# Patient Record
Sex: MALE | Race: WHITE | Employment: UNEMPLOYED | URBAN - METROPOLITAN AREA
[De-identification: names, ages, dates, MRNs, and addresses within clinical notes are randomized per-mention and may not be internally consistent; named-entity substitution may affect disease eponyms.]

---

## 2017-09-16 ENCOUNTER — APPOINTMENT (OUTPATIENT)
Dept: CT IMAGING | Age: 47
End: 2017-09-16
Payer: SELF-PAY

## 2017-09-16 ENCOUNTER — HOSPITAL ENCOUNTER (EMERGENCY)
Age: 47
Discharge: HOME | End: 2017-09-16
Attending: EMERGENCY MEDICINE
Payer: SELF-PAY

## 2017-09-16 VITALS
DIASTOLIC BLOOD PRESSURE: 82 MMHG | OXYGEN SATURATION: 86 % | TEMPERATURE: 98.9 F | SYSTOLIC BLOOD PRESSURE: 131 MMHG | WEIGHT: 200 LBS | BODY MASS INDEX: 29.62 KG/M2 | HEART RATE: 85 BPM | RESPIRATION RATE: 18 BRPM | HEIGHT: 69 IN

## 2017-09-16 DIAGNOSIS — Z53.20 LEFT BEFORE TREATMENT COMPLETED: ICD-10-CM

## 2017-09-16 DIAGNOSIS — S09.90XA HEAD TRAUMA, INITIAL ENCOUNTER: Primary | ICD-10-CM

## 2017-09-16 PROCEDURE — 99282 EMERGENCY DEPT VISIT SF MDM: CPT | Performed by: EMERGENCY MEDICINE

## 2017-09-16 PROCEDURE — 72125 CT NECK SPINE W/O DYE: CPT

## 2017-09-16 PROCEDURE — 74011250637 HC RX REV CODE- 250/637: Performed by: PHYSICIAN ASSISTANT

## 2017-09-16 PROCEDURE — 70450 CT HEAD/BRAIN W/O DYE: CPT

## 2017-09-16 RX ORDER — ONDANSETRON 4 MG/1
4 TABLET, ORALLY DISINTEGRATING ORAL
Status: COMPLETED | OUTPATIENT
Start: 2017-09-16 | End: 2017-09-16

## 2017-09-16 RX ORDER — ONDANSETRON 2 MG/ML
4 INJECTION INTRAMUSCULAR; INTRAVENOUS
Status: DISCONTINUED | OUTPATIENT
Start: 2017-09-16 | End: 2017-09-17 | Stop reason: HOSPADM

## 2017-09-16 RX ADMIN — ONDANSETRON 4 MG: 4 TABLET, ORALLY DISINTEGRATING ORAL at 17:56
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
## 2017-09-17 NOTE — ED NOTES
Offered to move patient into room with bed. Pt stated \"my wife is going to get the car. No one is doing anything for me\"  \"the doctor saw me an hour ago and nothing is being done\"    Note earlier note of time MD with patient for assessment.
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
## 2018-06-01 ENCOUNTER — APPOINTMENT (OUTPATIENT)
Dept: GENERAL RADIOLOGY | Age: 48
End: 2018-06-01
Attending: EMERGENCY MEDICINE
Payer: SELF-PAY

## 2018-06-01 ENCOUNTER — HOSPITAL ENCOUNTER (EMERGENCY)
Age: 48
Discharge: HOME | End: 2018-06-01
Attending: EMERGENCY MEDICINE
Payer: SELF-PAY

## 2018-06-01 VITALS
TEMPERATURE: 97.7 F | SYSTOLIC BLOOD PRESSURE: 115 MMHG | HEART RATE: 98 BPM | OXYGEN SATURATION: 97 % | HEIGHT: 69 IN | BODY MASS INDEX: 28.14 KG/M2 | RESPIRATION RATE: 16 BRPM | DIASTOLIC BLOOD PRESSURE: 71 MMHG | WEIGHT: 190 LBS

## 2018-06-01 DIAGNOSIS — S63.502A LEFT WRIST SPRAIN, INITIAL ENCOUNTER: Primary | ICD-10-CM

## 2018-06-01 DIAGNOSIS — R73.9 HYPERGLYCEMIA: ICD-10-CM

## 2018-06-01 LAB
ALBUMIN SERPL-MCNC: 3.5 G/DL (ref 3.5–5)
ALBUMIN/GLOB SERPL: 0.9 {RATIO} (ref 1.2–3.5)
ALP SERPL-CCNC: 113 U/L (ref 50–136)
ALT SERPL-CCNC: 25 U/L (ref 12–65)
ANION GAP SERPL CALC-SCNC: 8 MMOL/L (ref 7–16)
AST SERPL-CCNC: 11 U/L (ref 15–37)
BASOPHILS # BLD: 0 K/UL (ref 0–0.2)
BASOPHILS NFR BLD: 0 % (ref 0–2)
BILIRUB SERPL-MCNC: 0.2 MG/DL (ref 0.2–1.1)
BUN SERPL-MCNC: 11 MG/DL (ref 6–23)
CALCIUM SERPL-MCNC: 9.2 MG/DL (ref 8.3–10.4)
CHLORIDE SERPL-SCNC: 101 MMOL/L (ref 98–107)
CO2 SERPL-SCNC: 27 MMOL/L (ref 21–32)
CREAT SERPL-MCNC: 0.88 MG/DL (ref 0.8–1.5)
DIFFERENTIAL METHOD BLD: ABNORMAL
EOSINOPHIL # BLD: 0.1 K/UL (ref 0–0.8)
EOSINOPHIL NFR BLD: 2 % (ref 0.5–7.8)
ERYTHROCYTE [DISTWIDTH] IN BLOOD BY AUTOMATED COUNT: 14.6 % (ref 11.9–14.6)
GLOBULIN SER CALC-MCNC: 4.1 G/DL (ref 2.3–3.5)
GLUCOSE BLD STRIP.AUTO-MCNC: 182 MG/DL (ref 65–100)
GLUCOSE BLD STRIP.AUTO-MCNC: 366 MG/DL (ref 65–100)
GLUCOSE SERPL-MCNC: 419 MG/DL (ref 65–100)
HCT VFR BLD AUTO: 43.2 % (ref 41.1–50.3)
HGB BLD-MCNC: 14 G/DL (ref 13.6–17.2)
IMM GRANULOCYTES # BLD: 0 K/UL (ref 0–0.5)
IMM GRANULOCYTES NFR BLD AUTO: 0 % (ref 0–5)
LYMPHOCYTES # BLD: 4.1 K/UL (ref 0.5–4.6)
LYMPHOCYTES NFR BLD: 49 % (ref 13–44)
MCH RBC QN AUTO: 26.9 PG (ref 26.1–32.9)
MCHC RBC AUTO-ENTMCNC: 32.4 G/DL (ref 31.4–35)
MCV RBC AUTO: 83.1 FL (ref 79.6–97.8)
MONOCYTES # BLD: 0.4 K/UL (ref 0.1–1.3)
MONOCYTES NFR BLD: 5 % (ref 4–12)
NEUTS SEG # BLD: 3.7 K/UL (ref 1.7–8.2)
NEUTS SEG NFR BLD: 44 % (ref 43–78)
PLATELET # BLD AUTO: 300 K/UL (ref 150–450)
PMV BLD AUTO: 10 FL (ref 10.8–14.1)
POTASSIUM SERPL-SCNC: 3.9 MMOL/L (ref 3.5–5.1)
PROT SERPL-MCNC: 7.6 G/DL (ref 6.3–8.2)
RBC # BLD AUTO: 5.2 M/UL (ref 4.23–5.67)
SODIUM SERPL-SCNC: 136 MMOL/L (ref 136–145)
WBC # BLD AUTO: 8.4 K/UL (ref 4.3–11.1)

## 2018-06-01 PROCEDURE — 80053 COMPREHEN METABOLIC PANEL: CPT | Performed by: EMERGENCY MEDICINE

## 2018-06-01 PROCEDURE — 99284 EMERGENCY DEPT VISIT MOD MDM: CPT | Performed by: EMERGENCY MEDICINE

## 2018-06-01 PROCEDURE — 85025 COMPLETE CBC W/AUTO DIFF WBC: CPT | Performed by: EMERGENCY MEDICINE

## 2018-06-01 PROCEDURE — 73110 X-RAY EXAM OF WRIST: CPT

## 2018-06-01 PROCEDURE — 74011636637 HC RX REV CODE- 636/637: Performed by: EMERGENCY MEDICINE

## 2018-06-01 PROCEDURE — 82962 GLUCOSE BLOOD TEST: CPT

## 2018-06-01 PROCEDURE — 96374 THER/PROPH/DIAG INJ IV PUSH: CPT | Performed by: EMERGENCY MEDICINE

## 2018-06-01 RX ORDER — METFORMIN HYDROCHLORIDE 500 MG/1
500 TABLET ORAL 2 TIMES DAILY WITH MEALS
Qty: 60 TAB | Refills: 0 | Status: SHIPPED | OUTPATIENT
Start: 2018-06-01 | End: 2018-07-01

## 2018-06-01 RX ADMIN — INSULIN HUMAN 10 UNITS: 100 INJECTION, SOLUTION PARENTERAL at 12:28

## 2018-06-01 NOTE — ED NOTES
I have reviewed discharge instructions with the patient. The patient verbalized understanding. Patient left ED via Discharge Method: ambulatory to Home with family. Opportunity for questions and clarification provided. Patient given 1 scripts. To continue your aftercare when you leave the hospital, you may receive an automated call from our care team to check in on how you are doing. This is a free service and part of our promise to provide the best care and service to meet your aftercare needs.  If you have questions, or wish to unsubscribe from this service please call [phone]. Thank you for Choosing our [facility] Emergency Department.
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
## 2018-06-01 NOTE — DISCHARGE INSTRUCTIONS
Learning About RICE (Rest, Ice, Compression, and Elevation)  What is RICE? RICE is a way to care for an injury. RICE helps relieve pain and swelling. It may also help with healing and flexibility. RICE stands for:  · Rest and protect the injured or sore area. · Ice or a cold pack used as soon as possible. · Compression, or wrapping the injured or sore area with an elastic bandage. · Elevation (propping up) the injured or sore area. How do you do RICE? You can use RICE for home treatment when you have general aches and pains or after an injury or surgery. Rest  · Do not put weight on the injury for at least 24 to 48 hours. · Use crutches for a badly sprained knee or ankle. · Support a sprained wrist, elbow, or shoulder with a sling. Ice  · Put ice or a cold pack on the injury right away to reduce pain and swelling. Frozen vegetables will also work as an ice pack. Put a thin cloth between the ice or cold pack and your skin. The cloth protects the injured area from getting too cold. · Use ice for 10 to 15 minutes at a time for the first 48 to 72 hours. Compression  · Use compression for sprains, strains, and surgeries of the arms and legs. · Wrap the injured area with an elastic bandage or compression sleeve to reduce swelling. · Don't wrap it too tightly. If the area below it feels numb, tingles, or feels cool, loosen the wrap. Elevation  · Use elevation for areas of the body that can be propped up, such as arms and legs. · Prop up the injured area on pillows whenever you use ice. Keep it propped up anytime you sit or lie down. · Try to keep the injured area at or above the level of your heart. This will help reduce swelling and bruising. Where can you learn more? Go to http://xochitl-ming.info/. Enter Q816 in the search box to learn more about \"Learning About RICE (Rest, Ice, Compression, and Elevation). \"  Current as of: March 21, 2017  Content Version: 11.4  © 8405-8521 Healthwise, Incorporated. Care instructions adapted under license by [employer] (which disclaims liability or warranty for this information). If you have questions about a medical condition or this instruction, always ask your healthcare professional. [address] any warranty or liability for your use of this information. Learning About High Blood Sugar  What is high blood sugar? Your body turns the food you eat into glucose (sugar), which it uses for energy. But if your body isn't able to use the sugar right away, it can build up in your blood and lead to high blood sugar. When the amount of sugar in your blood stays too high for too much of the time, you may have diabetes. Diabetes is a disease that can cause serious health problems. The good news is that lifestyle changes may help you get your blood sugar back to normal and avoid or delay diabetes. What causes high blood sugar? Sugar (glucose) can build up in your blood if you:  · Are overweight. · Have a family history of diabetes. · Take certain medicines, such as steroids. What are the symptoms? Having high blood sugar may not cause any symptoms at all. Or it may make you feel very thirsty or very hungry. You may also urinate more often than usual, have blurry vision, or lose weight without trying. How is high blood sugar treated? You can take steps to lower your blood sugar level if you understand what makes it get higher. Your doctor may want you to learn how to test your blood sugar level at home. Then you can see how illness, stress, or different kinds of food or medicine raise or lower your blood sugar level. Other tests may be needed to see if you have diabetes. How can you prevent high blood sugar? · Watch your weight. If you're overweight, losing just a small amount of weight may help. Reducing fat around your waist is most important.   · Limit the amount of calories, sweets, and unhealthy fat you eat. Ask your doctor if a dietitian can help you. A registered dietitian can help you create meal plans that fit your lifestyle. · Get at least 30 minutes of exercise on most days of the week. Exercise helps control your blood sugar. It also helps you maintain a healthy weight. Walking is a good choice. You also may want to do other activities, such as running, swimming, cycling, or playing tennis or team sports. · If your doctor prescribed medicines, take them exactly as prescribed. Call your doctor if you think you are having a problem with your medicine. You will get more details on the specific medicines your doctor prescribes. Follow-up care is a key part of your treatment and safety. Be sure to make and go to all appointments, and call your doctor if you are having problems. It's also a good idea to know your test results and keep a list of the medicines you take. Where can you learn more? Go to http://xochitl-ming.info/. Enter O108 in the search box to learn more about \"Learning About High Blood Sugar. \"  Current as of: March 13, 2017  Content Version: 11.4  © 5401-3435 Healthwise, Incorporated. Care instructions adapted under license by [employer] (which disclaims liability or warranty for this information). If you have questions about a medical condition or this instruction, always ask your healthcare professional. [address] any warranty or liability for your use of this information.
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
## 2020-06-21 ENCOUNTER — APPOINTMENT (OUTPATIENT)
Dept: GENERAL RADIOLOGY | Age: 50
DRG: 174 | End: 2020-06-21
Attending: EMERGENCY MEDICINE
Payer: MEDICAID

## 2020-06-21 ENCOUNTER — HOSPITAL ENCOUNTER (INPATIENT)
Age: 50
LOS: 2 days | Discharge: HOME | DRG: 174 | End: 2020-06-23
Attending: EMERGENCY MEDICINE | Admitting: INTERNAL MEDICINE
Payer: MEDICAID

## 2020-06-21 DIAGNOSIS — I50.21 ACUTE HFREF (HEART FAILURE WITH REDUCED EJECTION FRACTION) (HCC): ICD-10-CM

## 2020-06-21 DIAGNOSIS — I21.19 ACUTE ST ELEVATION MYOCARDIAL INFARCTION (STEMI) INVOLVING OTHER CORONARY ARTERY OF INFERIOR WALL (HCC): Primary | ICD-10-CM

## 2020-06-21 DIAGNOSIS — G81.94 ACUTE LEFT HEMIPARESIS (HCC): ICD-10-CM

## 2020-06-21 DIAGNOSIS — E78.5 DYSLIPIDEMIA ASSOCIATED WITH TYPE 2 DIABETES MELLITUS (HCC): ICD-10-CM

## 2020-06-21 DIAGNOSIS — I21.01 ST ELEVATION MYOCARDIAL INFARCTION INVOLVING LEFT MAIN CORONARY ARTERY (HCC): ICD-10-CM

## 2020-06-21 DIAGNOSIS — E11.69 DYSLIPIDEMIA ASSOCIATED WITH TYPE 2 DIABETES MELLITUS (HCC): ICD-10-CM

## 2020-06-21 PROBLEM — I21.3 STEMI (ST ELEVATION MYOCARDIAL INFARCTION) (HCC): Status: ACTIVE | Noted: 2020-06-21

## 2020-06-21 LAB
ALBUMIN SERPL-MCNC: 3.3 G/DL (ref 3.5–5)
ALBUMIN/GLOB SERPL: 0.9 {RATIO} (ref 1.2–3.5)
ALP SERPL-CCNC: 101 U/L (ref 50–136)
ALT SERPL-CCNC: 32 U/L (ref 12–65)
ANION GAP SERPL CALC-SCNC: 6 MMOL/L (ref 7–16)
AST SERPL-CCNC: 22 U/L (ref 15–37)
BASOPHILS # BLD: 0.1 K/UL (ref 0–0.2)
BASOPHILS NFR BLD: 1 % (ref 0–2)
BILIRUB SERPL-MCNC: 0.3 MG/DL (ref 0.2–1.1)
BUN SERPL-MCNC: 16 MG/DL (ref 6–23)
CALCIUM SERPL-MCNC: 8.3 MG/DL (ref 8.3–10.4)
CHLORIDE SERPL-SCNC: 106 MMOL/L (ref 98–107)
CO2 SERPL-SCNC: 26 MMOL/L (ref 21–32)
CREAT SERPL-MCNC: 1.14 MG/DL (ref 0.8–1.5)
DIFFERENTIAL METHOD BLD: ABNORMAL
EOSINOPHIL # BLD: 0.2 K/UL (ref 0–0.8)
EOSINOPHIL NFR BLD: 2 % (ref 0.5–7.8)
ERYTHROCYTE [DISTWIDTH] IN BLOOD BY AUTOMATED COUNT: 13.4 % (ref 11.9–14.6)
EST. AVERAGE GLUCOSE BLD GHB EST-MCNC: 303 MG/DL
GLOBULIN SER CALC-MCNC: 3.7 G/DL (ref 2.3–3.5)
GLUCOSE BLD STRIP.AUTO-MCNC: 374 MG/DL (ref 65–100)
GLUCOSE SERPL-MCNC: 406 MG/DL (ref 65–100)
HBA1C MFR BLD: 12.2 % (ref 4.8–6)
HCT VFR BLD AUTO: 42.8 % (ref 41.1–50.3)
HGB BLD-MCNC: 13.9 G/DL (ref 13.6–17.2)
IMM GRANULOCYTES # BLD AUTO: 0 K/UL (ref 0–0.5)
IMM GRANULOCYTES NFR BLD AUTO: 0 % (ref 0–5)
LYMPHOCYTES # BLD: 3.5 K/UL (ref 0.5–4.6)
LYMPHOCYTES NFR BLD: 31 % (ref 13–44)
MCH RBC QN AUTO: 27.5 PG (ref 26.1–32.9)
MCHC RBC AUTO-ENTMCNC: 32.5 G/DL (ref 31.4–35)
MCV RBC AUTO: 84.6 FL (ref 79.6–97.8)
MONOCYTES # BLD: 0.6 K/UL (ref 0.1–1.3)
MONOCYTES NFR BLD: 5 % (ref 4–12)
NEUTS SEG # BLD: 7.1 K/UL (ref 1.7–8.2)
NEUTS SEG NFR BLD: 62 % (ref 43–78)
NRBC # BLD: 0 K/UL (ref 0–0.2)
PLATELET # BLD AUTO: 239 K/UL (ref 150–450)
PMV BLD AUTO: 9.8 FL (ref 9.4–12.3)
POTASSIUM SERPL-SCNC: 4.5 MMOL/L (ref 3.5–5.1)
PROT SERPL-MCNC: 7 G/DL (ref 6.3–8.2)
RBC # BLD AUTO: 5.06 M/UL (ref 4.23–5.6)
SODIUM SERPL-SCNC: 138 MMOL/L (ref 136–145)
TROPONIN-HIGH SENSITIVITY: 182 PG/ML (ref 0–14)
TROPONIN-HIGH SENSITIVITY: 9153.9 PG/ML (ref 0–14)
WBC # BLD AUTO: 11.5 K/UL (ref 4.3–11.1)

## 2020-06-21 PROCEDURE — 85347 COAGULATION TIME ACTIVATED: CPT

## 2020-06-21 PROCEDURE — C1725 CATH, TRANSLUMIN NON-LASER: HCPCS

## 2020-06-21 PROCEDURE — 80061 LIPID PANEL: CPT

## 2020-06-21 PROCEDURE — C1769 GUIDE WIRE: HCPCS

## 2020-06-21 PROCEDURE — 36415 COLL VENOUS BLD VENIPUNCTURE: CPT

## 2020-06-21 PROCEDURE — 99285 EMERGENCY DEPT VISIT HI MDM: CPT

## 2020-06-21 PROCEDURE — 77030011222 HC DEV INFL PTCA BSC -B

## 2020-06-21 PROCEDURE — 99152 MOD SED SAME PHYS/QHP 5/>YRS: CPT

## 2020-06-21 PROCEDURE — 84443 ASSAY THYROID STIM HORMONE: CPT

## 2020-06-21 PROCEDURE — 84484 ASSAY OF TROPONIN QUANT: CPT

## 2020-06-21 PROCEDURE — 96374 THER/PROPH/DIAG INJ IV PUSH: CPT

## 2020-06-21 PROCEDURE — 74011000250 HC RX REV CODE- 250: Performed by: INTERNAL MEDICINE

## 2020-06-21 PROCEDURE — 96375 TX/PRO/DX INJ NEW DRUG ADDON: CPT

## 2020-06-21 PROCEDURE — 74011636637 HC RX REV CODE- 636/637: Performed by: NURSE PRACTITIONER

## 2020-06-21 PROCEDURE — 92921 HC PTCA SNGL MAJOR COR VESL/BRNCH EA ADD RC: CPT

## 2020-06-21 PROCEDURE — B2111ZZ FLUOROSCOPY OF MULTIPLE CORONARY ARTERIES USING LOW OSMOLAR CONTRAST: ICD-10-PCS | Performed by: INTERNAL MEDICINE

## 2020-06-21 PROCEDURE — C1874 STENT, COATED/COV W/DEL SYS: HCPCS

## 2020-06-21 PROCEDURE — 4A023N7 MEASUREMENT OF CARDIAC SAMPLING AND PRESSURE, LEFT HEART, PERCUTANEOUS APPROACH: ICD-10-PCS | Performed by: INTERNAL MEDICINE

## 2020-06-21 PROCEDURE — 80053 COMPREHEN METABOLIC PANEL: CPT

## 2020-06-21 PROCEDURE — 92941 PRQ TRLML REVSC TOT OCCL AMI: CPT

## 2020-06-21 PROCEDURE — 83036 HEMOGLOBIN GLYCOSYLATED A1C: CPT

## 2020-06-21 PROCEDURE — 77030015766

## 2020-06-21 PROCEDURE — 93458 L HRT ARTERY/VENTRICLE ANGIO: CPT

## 2020-06-21 PROCEDURE — 77030019569 HC BND COMPR RAD TERU -B

## 2020-06-21 PROCEDURE — B2151ZZ FLUOROSCOPY OF LEFT HEART USING LOW OSMOLAR CONTRAST: ICD-10-PCS | Performed by: INTERNAL MEDICINE

## 2020-06-21 PROCEDURE — 77030040393 HC DRSG OPTIFOAM GENT MDII -B

## 2020-06-21 PROCEDURE — 027037Z DILATION OF CORONARY ARTERY, ONE ARTERY WITH FOUR OR MORE DRUG-ELUTING INTRALUMINAL DEVICES, PERCUTANEOUS APPROACH: ICD-10-PCS | Performed by: INTERNAL MEDICINE

## 2020-06-21 PROCEDURE — 74011250636 HC RX REV CODE- 250/636: Performed by: EMERGENCY MEDICINE

## 2020-06-21 PROCEDURE — 74011250637 HC RX REV CODE- 250/637: Performed by: NURSE PRACTITIONER

## 2020-06-21 PROCEDURE — 74011250636 HC RX REV CODE- 250/636: Performed by: NURSE PRACTITIONER

## 2020-06-21 PROCEDURE — C1894 INTRO/SHEATH, NON-LASER: HCPCS

## 2020-06-21 PROCEDURE — 77030012468 HC VLV BLEEDBK CNTRL ABBT -B

## 2020-06-21 PROCEDURE — 85025 COMPLETE CBC W/AUTO DIFF WBC: CPT

## 2020-06-21 PROCEDURE — 82962 GLUCOSE BLOOD TEST: CPT

## 2020-06-21 PROCEDURE — 74011636320 HC RX REV CODE- 636/320: Performed by: INTERNAL MEDICINE

## 2020-06-21 PROCEDURE — 74011250637 HC RX REV CODE- 250/637: Performed by: INTERNAL MEDICINE

## 2020-06-21 PROCEDURE — 99153 MOD SED SAME PHYS/QHP EA: CPT

## 2020-06-21 PROCEDURE — 65610000001 HC ROOM ICU GENERAL

## 2020-06-21 PROCEDURE — C1887 CATHETER, GUIDING: HCPCS

## 2020-06-21 PROCEDURE — 74011250636 HC RX REV CODE- 250/636: Performed by: INTERNAL MEDICINE

## 2020-06-21 PROCEDURE — 92929 HC PLC DE STNT +/-PTA MAJOR COR VESL/BRNCH  ADD RC: CPT

## 2020-06-21 PROCEDURE — 77030016699 HC CATH ANGI DX INFN1 CARD -A

## 2020-06-21 RX ORDER — ACETAMINOPHEN 325 MG/1
650 TABLET ORAL
Status: DISCONTINUED | OUTPATIENT
Start: 2020-06-21 | End: 2020-06-23 | Stop reason: HOSPADM

## 2020-06-21 RX ORDER — ONDANSETRON 2 MG/ML
4 INJECTION INTRAMUSCULAR; INTRAVENOUS
Status: COMPLETED | OUTPATIENT
Start: 2020-06-21 | End: 2020-06-21

## 2020-06-21 RX ORDER — MORPHINE SULFATE 10 MG/ML
4 INJECTION, SOLUTION INTRAMUSCULAR; INTRAVENOUS
Status: DISCONTINUED | OUTPATIENT
Start: 2020-06-21 | End: 2020-06-23 | Stop reason: HOSPADM

## 2020-06-21 RX ORDER — IBUPROFEN 200 MG
1 TABLET ORAL EVERY 24 HOURS
Status: DISCONTINUED | OUTPATIENT
Start: 2020-06-21 | End: 2020-06-23 | Stop reason: HOSPADM

## 2020-06-21 RX ORDER — HEPARIN SODIUM 10000 [USP'U]/ML
40-80 INJECTION, SOLUTION INTRAVENOUS; SUBCUTANEOUS
Status: DISCONTINUED | OUTPATIENT
Start: 2020-06-21 | End: 2020-06-21

## 2020-06-21 RX ORDER — ACETAMINOPHEN 325 MG/1
650 TABLET ORAL
Status: DISCONTINUED | OUTPATIENT
Start: 2020-06-21 | End: 2020-06-21 | Stop reason: SDUPTHER

## 2020-06-21 RX ORDER — LIDOCAINE HYDROCHLORIDE 10 MG/ML
1-30 INJECTION, SOLUTION EPIDURAL; INFILTRATION; INTRACAUDAL; PERINEURAL ONCE
Status: COMPLETED | OUTPATIENT
Start: 2020-06-21 | End: 2020-06-21

## 2020-06-21 RX ORDER — MIDAZOLAM HYDROCHLORIDE 1 MG/ML
.5-2 INJECTION, SOLUTION INTRAMUSCULAR; INTRAVENOUS
Status: DISCONTINUED | OUTPATIENT
Start: 2020-06-21 | End: 2020-06-21

## 2020-06-21 RX ORDER — ATORVASTATIN CALCIUM 80 MG/1
80 TABLET, FILM COATED ORAL
Status: DISCONTINUED | OUTPATIENT
Start: 2020-06-21 | End: 2020-06-23 | Stop reason: HOSPADM

## 2020-06-21 RX ORDER — SODIUM CHLORIDE 0.9 % (FLUSH) 0.9 %
5-40 SYRINGE (ML) INJECTION AS NEEDED
Status: DISCONTINUED | OUTPATIENT
Start: 2020-06-21 | End: 2020-06-21 | Stop reason: SDUPTHER

## 2020-06-21 RX ORDER — MORPHINE SULFATE 2 MG/ML
2 INJECTION, SOLUTION INTRAMUSCULAR; INTRAVENOUS
Status: DISCONTINUED | OUTPATIENT
Start: 2020-06-21 | End: 2020-06-23 | Stop reason: HOSPADM

## 2020-06-21 RX ORDER — HYDROCODONE BITARTRATE AND ACETAMINOPHEN 5; 325 MG/1; MG/1
1 TABLET ORAL
Status: DISCONTINUED | OUTPATIENT
Start: 2020-06-21 | End: 2020-06-23 | Stop reason: HOSPADM

## 2020-06-21 RX ORDER — INSULIN LISPRO 100 [IU]/ML
INJECTION, SOLUTION INTRAVENOUS; SUBCUTANEOUS
Status: DISCONTINUED | OUTPATIENT
Start: 2020-06-21 | End: 2020-06-23 | Stop reason: HOSPADM

## 2020-06-21 RX ORDER — METOPROLOL TARTRATE 25 MG/1
25 TABLET, FILM COATED ORAL 2 TIMES DAILY
Status: DISCONTINUED | OUTPATIENT
Start: 2020-06-21 | End: 2020-06-22

## 2020-06-21 RX ORDER — LORAZEPAM 1 MG/1
1 TABLET ORAL
Status: DISCONTINUED | OUTPATIENT
Start: 2020-06-21 | End: 2020-06-23 | Stop reason: HOSPADM

## 2020-06-21 RX ORDER — HEPARIN SODIUM 5000 [USP'U]/ML
5000 INJECTION, SOLUTION INTRAVENOUS; SUBCUTANEOUS
Status: COMPLETED | OUTPATIENT
Start: 2020-06-21 | End: 2020-06-21

## 2020-06-21 RX ORDER — GUAIFENESIN 100 MG/5ML
81 LIQUID (ML) ORAL DAILY
Status: DISCONTINUED | OUTPATIENT
Start: 2020-06-22 | End: 2020-06-23 | Stop reason: HOSPADM

## 2020-06-21 RX ORDER — ONDANSETRON 2 MG/ML
4 INJECTION INTRAMUSCULAR; INTRAVENOUS
Status: DISCONTINUED | OUTPATIENT
Start: 2020-06-21 | End: 2020-06-23 | Stop reason: HOSPADM

## 2020-06-21 RX ORDER — MAG HYDROX/ALUMINUM HYD/SIMETH 200-200-20
30 SUSPENSION, ORAL (FINAL DOSE FORM) ORAL
Status: DISCONTINUED | OUTPATIENT
Start: 2020-06-21 | End: 2020-06-23 | Stop reason: HOSPADM

## 2020-06-21 RX ORDER — INSULIN LISPRO 100 [IU]/ML
INJECTION, SOLUTION INTRAVENOUS; SUBCUTANEOUS
Status: DISCONTINUED | OUTPATIENT
Start: 2020-06-21 | End: 2020-06-21 | Stop reason: SDUPTHER

## 2020-06-21 RX ORDER — DOCUSATE SODIUM 100 MG/1
100 CAPSULE, LIQUID FILLED ORAL
Status: DISCONTINUED | OUTPATIENT
Start: 2020-06-21 | End: 2020-06-23 | Stop reason: HOSPADM

## 2020-06-21 RX ORDER — LORAZEPAM 2 MG/ML
1 INJECTION INTRAMUSCULAR
Status: DISCONTINUED | OUTPATIENT
Start: 2020-06-21 | End: 2020-06-21 | Stop reason: SDUPTHER

## 2020-06-21 RX ORDER — LISINOPRIL 5 MG/1
5 TABLET ORAL DAILY
Status: DISCONTINUED | OUTPATIENT
Start: 2020-06-22 | End: 2020-06-23 | Stop reason: HOSPADM

## 2020-06-21 RX ORDER — SODIUM CHLORIDE 0.9 % (FLUSH) 0.9 %
5-40 SYRINGE (ML) INJECTION AS NEEDED
Status: DISCONTINUED | OUTPATIENT
Start: 2020-06-21 | End: 2020-06-23 | Stop reason: HOSPADM

## 2020-06-21 RX ORDER — MORPHINE SULFATE 2 MG/ML
2 INJECTION, SOLUTION INTRAMUSCULAR; INTRAVENOUS
Status: COMPLETED | OUTPATIENT
Start: 2020-06-21 | End: 2020-06-21

## 2020-06-21 RX ORDER — DIPHENHYDRAMINE HCL 25 MG
25 CAPSULE ORAL
Status: DISCONTINUED | OUTPATIENT
Start: 2020-06-21 | End: 2020-06-23 | Stop reason: HOSPADM

## 2020-06-21 RX ORDER — NITROGLYCERIN 0.4 MG/1
0.4 TABLET SUBLINGUAL
Status: DISCONTINUED | OUTPATIENT
Start: 2020-06-21 | End: 2020-06-23 | Stop reason: HOSPADM

## 2020-06-21 RX ORDER — PANTOPRAZOLE SODIUM 40 MG/1
40 TABLET, DELAYED RELEASE ORAL
Status: DISCONTINUED | OUTPATIENT
Start: 2020-06-21 | End: 2020-06-23 | Stop reason: HOSPADM

## 2020-06-21 RX ORDER — SODIUM CHLORIDE 9 MG/ML
75 INJECTION, SOLUTION INTRAVENOUS CONTINUOUS
Status: DISCONTINUED | OUTPATIENT
Start: 2020-06-21 | End: 2020-06-23 | Stop reason: HOSPADM

## 2020-06-21 RX ORDER — FENTANYL CITRATE 50 UG/ML
25-50 INJECTION, SOLUTION INTRAMUSCULAR; INTRAVENOUS
Status: DISCONTINUED | OUTPATIENT
Start: 2020-06-21 | End: 2020-06-21

## 2020-06-21 RX ORDER — HEPARIN SODIUM 200 [USP'U]/100ML
3 INJECTION, SOLUTION INTRAVENOUS CONTINUOUS
Status: DISCONTINUED | OUTPATIENT
Start: 2020-06-21 | End: 2020-06-21

## 2020-06-21 RX ORDER — SODIUM CHLORIDE 0.9 % (FLUSH) 0.9 %
5-40 SYRINGE (ML) INJECTION EVERY 8 HOURS
Status: DISCONTINUED | OUTPATIENT
Start: 2020-06-21 | End: 2020-06-23 | Stop reason: HOSPADM

## 2020-06-21 RX ORDER — NITROGLYCERIN 0.4 MG/1
0.4 TABLET SUBLINGUAL
Status: DISCONTINUED | OUTPATIENT
Start: 2020-06-21 | End: 2020-06-21 | Stop reason: SDUPTHER

## 2020-06-21 RX ORDER — SODIUM CHLORIDE 0.9 % (FLUSH) 0.9 %
5-40 SYRINGE (ML) INJECTION EVERY 8 HOURS
Status: DISCONTINUED | OUTPATIENT
Start: 2020-06-21 | End: 2020-06-21 | Stop reason: SDUPTHER

## 2020-06-21 RX ADMIN — METOPROLOL TARTRATE 25 MG: 25 TABLET, FILM COATED ORAL at 20:58

## 2020-06-21 RX ADMIN — ONDANSETRON 4 MG: 2 INJECTION INTRAMUSCULAR; INTRAVENOUS at 18:18

## 2020-06-21 RX ADMIN — HEPARIN SODIUM 1500 UNITS: 10000 INJECTION, SOLUTION INTRAVENOUS; SUBCUTANEOUS at 19:56

## 2020-06-21 RX ADMIN — TICAGRELOR 180 MG: 90 TABLET ORAL at 19:35

## 2020-06-21 RX ADMIN — NITROGLYCERIN 0.15 MG: 200 INJECTION, SOLUTION INTRAVENOUS at 19:40

## 2020-06-21 RX ADMIN — IOPAMIDOL 290 ML: 755 INJECTION, SOLUTION INTRAVENOUS at 19:46

## 2020-06-21 RX ADMIN — MIDAZOLAM 2 MG: 1 INJECTION INTRAMUSCULAR; INTRAVENOUS at 18:54

## 2020-06-21 RX ADMIN — HEPARIN SODIUM 2 ML: 10000 INJECTION, SOLUTION INTRAVENOUS; SUBCUTANEOUS at 18:54

## 2020-06-21 RX ADMIN — NITROGLYCERIN 0.15 MG: 200 INJECTION, SOLUTION INTRAVENOUS at 19:31

## 2020-06-21 RX ADMIN — TIROFIBAN 0.15 MCG/KG/MIN: 5 INJECTION, SOLUTION INTRAVENOUS at 19:07

## 2020-06-21 RX ADMIN — FENTANYL CITRATE 50 MCG: 50 INJECTION INTRAMUSCULAR; INTRAVENOUS at 19:05

## 2020-06-21 RX ADMIN — SODIUM CHLORIDE 75 ML/HR: 9 INJECTION, SOLUTION INTRAVENOUS at 20:08

## 2020-06-21 RX ADMIN — PANTOPRAZOLE SODIUM 40 MG: 40 TABLET, DELAYED RELEASE ORAL at 20:58

## 2020-06-21 RX ADMIN — Medication 10 ML: at 21:01

## 2020-06-21 RX ADMIN — ATORVASTATIN CALCIUM 80 MG: 80 TABLET, FILM COATED ORAL at 21:01

## 2020-06-21 RX ADMIN — HEPARIN SODIUM 5000 UNITS: 5000 INJECTION INTRAVENOUS; SUBCUTANEOUS at 18:17

## 2020-06-21 RX ADMIN — FENTANYL CITRATE 50 MCG: 50 INJECTION INTRAMUSCULAR; INTRAVENOUS at 19:31

## 2020-06-21 RX ADMIN — LIDOCAINE HYDROCHLORIDE 2 ML: 10 INJECTION, SOLUTION EPIDURAL; INFILTRATION; INTRACAUDAL; PERINEURAL at 18:54

## 2020-06-21 RX ADMIN — HEPARIN SODIUM 3 ML/HR: 200 INJECTION, SOLUTION INTRAVENOUS at 18:54

## 2020-06-21 RX ADMIN — MORPHINE SULFATE 2 MG: 2 INJECTION, SOLUTION INTRAMUSCULAR; INTRAVENOUS at 18:18

## 2020-06-21 RX ADMIN — HEPARIN SODIUM 3000 UNITS: 10000 INJECTION, SOLUTION INTRAVENOUS; SUBCUTANEOUS at 19:11

## 2020-06-21 RX ADMIN — INSULIN LISPRO 10 UNITS: 100 INJECTION, SOLUTION INTRAVENOUS; SUBCUTANEOUS at 22:00

## 2020-06-21 NOTE — PROCEDURES
Brief Cardiac Procedure Note    Patient: Asya Moreau MRN: [id number]  SSN: [id number]    YOB: 1970  Age: 44 y.o. Sex: male      Date of Procedure: 6/21/2020     Pre-procedure Diagnosis: STEMI    Post-procedure Diagnosis: Coronary Artery Disease    Reason for Procedure: ACS < or = 24 Hours    Procedure: Left Heart Catheterization with Percutaneous Coronary Intervention    Brief Description of Procedure: PCI RCA INTO PDA    Performed By: Christi Patel MD     Assistants: NONE    Anesthesia: Moderate Sedation    Estimated Blood Loss: Less than 10 mL      Specimens: None    Implants: None    Findings:   LV INF-LAT HK, 45%, EDP 20, NO MR OR AV GRADIENT  LMCA MILD IRREG  LAD MID SMOOTH 30%, D1 MILD IRREG  LCX PROX FOCAL 90%  RCA PROX 95% SHELF, MID 90%, DISTAL 99% AT BIFURCATION, MID PDA 90% HAZY    FULL METAL JACKET FROM PROX PDA TO PROX RCA  OVERLAPPED 2.0 X18 ASHA MID PDA, 2.5 X 30 ONXY DISTAL RCA INTO PROX PDA, OVERLAPPING MID PDA STENT  3.0 X 38 ASHA IN MID AND PROX RCA TO CREATE LONG STENTED REGION FROM PROX RCA INTO MID PDA  JAILED PLB WITH 90% STENOSIS, KISSED 2.0 IN PLB AND 2.5 IN PDA TO 12, GOOD RESULT    RIGHT RADIAL  ASA/BRILINTA/HEPARIN/AGGRASTAT X 8 HOURS    Complications: None    Recommendations: Continue medical therapy.     Signed By: Christi Patel MD     June 21, 2020

## 2020-06-21 NOTE — H&P
[facility] CARDIOLOGY HISTORY AND PHYSICAL    2020 7:58 PM    Admit Date: 2020    Admit Diagnosis: STEMI (ST elevation myocardial infarction) ([facility] [address] [I21.3]      Subjective:   45yo WM with no CAD history presents with acute severe unremitting CP and intermittent recurrent inferior ST elevation with bradycardia consistent with stuttering inferior MI.  Known DM Type II and dyslipidemia but takes no current meds. ? History of hepatitis C as well. Currently hemodynamically stable but still with severe CP in ER despite ASA, heparin, morphine. To cath lab stat. Allergies   Allergen Reactions    Bactrim [Sulfamethoprim] Other (comments)     Burns me inside out    Sulfa (Sulfonamide Antibiotics) Rash       Past Medical History:   Diagnosis Date    Diabetes ([facility] [address]     Hypertension     Other ill-defined conditions(799.89)     high cholesterol       Family History:  Mom  with MI at 52yo. Social History:  No alcohol or illicit drug use. + 1ppd smoker since 12yo. Review of Systems:  Constitutional- no recent fever, chills, abnormal weight loss  Eyes- no recent visual changes  Ears- no recent hearing loss  Cardiac- see HPI  Pulmonary- no wheezing or sputum production  Gastrointestinal- no diarrhea or constipation  Genitourinary- no hematuria or dysuria  Neurologic- no history of CVA or seizure disorder  Musculoskeletal- no arthritis or myalgia  Vascular- no claudication or nonhealing ulcers  Dermatologic- no rash or abnormal hair loss    Objective:      Vitals:    20 1831 20 1836 20 1838 20 1949   BP: 124/79  123/74 129/77   Pulse: 89 88 86 82   Resp:  15 13 18   Temp:       SpO2: 100% 100% 100% 98%   Weight:       Height:           Physical Exam:  Neuro:  Cranial nerves 2-12 intact.   Skin: warm and dry  HEENT:  NC/AT, conjunctiva noninjected, sclera anicteric, oropharynx clear  Neck: supple without adenopathy or thyromegaly  CV: regular rate and rhythm, no murmurs, rubs, or gallops  Lungs: clear bilaterally  Abdomen: soft, nontender, nondistended, normal bowel sounds  Extremities: No cyanosis or edema, distal pulses 2+ and symmetric bilaterally  Psychiatric: alert and oriented x 3    Prior to Admission Medications   Prescriptions Last Dose Informant Patient Reported? Taking?   insulin aspart protamine/insulin aspart (NOVOLOG MIX 70-30) 100 unit/mL (70-30) injection   Yes No   Si Units by SubCUTAneous route Daily (before breakfast). insulin aspart protamine/insulin aspart (NOVOLOG MIX 70-30) 100 unit/mL (70-30) injection   Yes No   Si-12 Units by SubCUTAneous route Daily (before dinner). Facility-Administered Medications: None       Data Review:   Recent Labs     20  1813      K 4.5   BUN 16   CREA 1.14   *   WBC 11.5*   HGB 13.9   HCT 42.8          Assessment and Plan:     Acute inferior MI- The benefits and risks of left heart catheterization and possible percutaneous intervention were discussed with the patient. Risks including but not limited to bleeding, infection, contrast allergy reaction, acute kidney injury, MI, stroke, emergent CABG and death were discussed. The patient understands the risks of the procedure and wishes to proceed. Active Problems:    DM2 (diabetes mellitus, type 2) ([facility]) - slide scale insulin, A1C in AM      HTN (hypertension)- lopressor, lisinopril, titrate as needed      STEMI (ST elevation myocardial infarction) ([facility] [address] (2020)- to cath lab stat- as above. Tobacco abuse- smoking cessation counseling post discharge        LAURY Eden MD  [facility] Cardiology  Pager [phone]

## 2020-06-21 NOTE — PROGRESS NOTES
TRANSFER - OUT REPORT:    Verbal report given to RN on Chasity Ly  being transferred to ICU for routine progression of care       Report consisted of patients Situation, Background, Assessment and Recommendations(SBAR). Information from the following report(s) SBAR, Kardex, Procedure Summary and MAR was reviewed with the receiving nurse. Opportunity for questions and clarification was provided.       STEMI with Dr Frederick Reusing  MADY x 4 RCA   Balloon angioplasty DINESH  aggrastat drip to infuse for 8 hours  3000 heparin    180 brilinta  2 versed  100 fentanyl  Right radial R band 12ml at 1945

## 2020-06-21 NOTE — ED TRIAGE NOTES
Pt arrives per EMS from home. Called for chest pain. EMS found pt with HR 20's. Attempted Atropine. BP low in 80's so couldn't   Sedate when attempted to pace so pt couldn't tolerate. On arrival to ER HR up to 90's and elevation started.  called right to room.

## 2020-06-21 NOTE — ED PROVIDER NOTES
Mask was worn during the entire patient examination. Yasir Hobbs is a 44 y.o. male who presents to the ED with a chief complaint of chest pain. Started about 1 hour prior to arrival.  Patient actually denies any past cardiac history believes he may have had a heart attack one time and stayed at home but he is never had a heart cath or stress test.  He was bradycardic for EMS and they gave atropine and were temporarily pacing him but he was yelling at them for doing that so they stopped. The pain is been very intense and radiates down his left arm. There is been some shortness of breath and nausea associated with it. Past Medical History:   Diagnosis Date    Diabetes ([facility] [address] [facility]     Hypertension     Other ill-defined conditions(799.89)     high cholesterol       Past Surgical History:   Procedure Laterality Date    HX APPENDECTOMY      HX OTHER SURGICAL      hemorrhoidectomy         History reviewed. No pertinent family history.     Social History     Socioeconomic History    Marital status:      Spouse name: Not on file    Number of children: Not on file    Years of education: Not on file    Highest education level: Not on file   Occupational History    Not on file   Social Needs    Financial resource strain: Not on file    Food insecurity     Worry: Not on file     Inability: Not on file    Transportation needs     Medical: Not on file     Non-medical: Not on file   Tobacco Use    Smoking status: Current Every Day Smoker     Packs/day: 1.00   Substance and Sexual Activity    Alcohol use: Yes     Comment: socially    Drug use: No    Sexual activity: Not on file   Lifestyle    Physical activity     Days per week: Not on file     Minutes per session: Not on file    Stress: Not on file   Relationships    Social connections     Talks on phone: Not on file     Gets together: Not on file     Attends Jainism service: Not on file     Active member of club or organization: Not on file Attends meetings of clubs or organizations: Not on file     Relationship status: Not on file    Intimate partner violence     Fear of current or ex partner: Not on file     Emotionally abused: Not on file     Physically abused: Not on file     Forced sexual activity: Not on file   Other Topics Concern    Not on file   Social History Narrative    Not on file         ALLERGIES: Bactrim [sulfamethoprim] and Sulfa (sulfonamide antibiotics)    Review of Systems   Constitutional: Negative for chills and fever. Respiratory: Positive for chest tightness and shortness of breath. Negative for apnea, cough, choking, wheezing and stridor. Cardiovascular: Negative for chest pain and palpitations. Gastrointestinal: Positive for nausea and vomiting. Negative for abdominal pain and diarrhea. Musculoskeletal: Negative for arthralgias, neck pain and neck stiffness. Skin: Negative for color change, pallor and wound. Neurological: Negative for weakness and numbness. All other systems reviewed and are negative. Vitals:    06/21/20 1809   BP: 125/77   Pulse: 94   Resp: 18   Temp: 97.8 °F (36.6 °C)   SpO2: 100%   Weight: 88.5 kg (195 lb)   Height: 5' 9\" (1.753 m)            Physical Exam  Vitals signs and nursing note reviewed. Constitutional:       General: He is in acute distress. Appearance: He is well-developed. He is ill-appearing, toxic-appearing and diaphoretic. HENT:      Head: Normocephalic and atraumatic. Eyes:      Conjunctiva/sclera: Conjunctivae normal.   Neck:      Musculoskeletal: No neck rigidity. Cardiovascular:      Rate and Rhythm: Normal rate and regular rhythm. Pulmonary:      Effort: Pulmonary effort is normal. No tachypnea, accessory muscle usage or respiratory distress. Breath sounds: No stridor. No decreased breath sounds, wheezing, rhonchi or rales. Skin:     General: Skin is warm. Capillary Refill: Capillary refill takes less than 2 seconds.       Coloration: Skin is pale. Neurological:      General: No focal deficit present. Mental Status: He is alert and oriented to person, place, and time. Cranial Nerves: No cranial nerve deficit. Motor: No weakness. Psychiatric:         Mood and Affect: Mood is anxious. Behavior: Behavior is agitated. MDM  Number of Diagnoses or Management Options  Diagnosis management comments: EMSs EKG that they obtained just prior to patient's arrival shows inferior STEMI. Irises not as conclusive I did call STEMI alert and spoke with cardiology given that he looks like he is having a STEMI incredibly uncomfortable and does have a pattern on EMS EKG. He received aspirin prior to arrival and we will give heparin now. We will not give nitroglycerin given his hypotension with inferior MI. Melissa Will MD; 6/21/2020 @6:21 PM Voice dictation software was used during the making of this note. This software is not perfect and grammatical and other typographical errors may be present.   This note has not been proofread for errors.  ===================================================================          Amount and/or Complexity of Data Reviewed  Clinical lab tests: ordered and reviewed  Independent visualization of images, tracings, or specimens: yes           Procedures

## 2020-06-22 LAB
ANION GAP SERPL CALC-SCNC: 4 MMOL/L (ref 7–16)
ANION GAP SERPL CALC-SCNC: 7 MMOL/L (ref 7–16)
ATRIAL RATE: 72 BPM
ATRIAL RATE: 88 BPM
ATRIAL RATE: 92 BPM
BASOPHILS # BLD: 0.1 K/UL (ref 0–0.2)
BASOPHILS NFR BLD: 1 % (ref 0–2)
BUN SERPL-MCNC: 13 MG/DL (ref 6–23)
BUN SERPL-MCNC: 15 MG/DL (ref 6–23)
CALCIUM SERPL-MCNC: 7.8 MG/DL (ref 8.3–10.4)
CALCIUM SERPL-MCNC: 8.2 MG/DL (ref 8.3–10.4)
CALCULATED P AXIS, ECG09: 37 DEGREES
CALCULATED P AXIS, ECG09: 42 DEGREES
CALCULATED P AXIS, ECG09: 50 DEGREES
CALCULATED R AXIS, ECG10: -22 DEGREES
CALCULATED R AXIS, ECG10: -59 DEGREES
CALCULATED R AXIS, ECG10: 14 DEGREES
CALCULATED T AXIS, ECG11: 42 DEGREES
CALCULATED T AXIS, ECG11: 61 DEGREES
CALCULATED T AXIS, ECG11: 64 DEGREES
CHLORIDE SERPL-SCNC: 109 MMOL/L (ref 98–107)
CHLORIDE SERPL-SCNC: 111 MMOL/L (ref 98–107)
CHOLEST SERPL-MCNC: 162 MG/DL
CO2 SERPL-SCNC: 26 MMOL/L (ref 21–32)
CO2 SERPL-SCNC: 28 MMOL/L (ref 21–32)
CREAT SERPL-MCNC: 0.69 MG/DL (ref 0.8–1.5)
CREAT SERPL-MCNC: 0.89 MG/DL (ref 0.8–1.5)
DIAGNOSIS, 93000: NORMAL
DIFFERENTIAL METHOD BLD: ABNORMAL
EOSINOPHIL # BLD: 0.1 K/UL (ref 0–0.8)
EOSINOPHIL NFR BLD: 1 % (ref 0.5–7.8)
ERYTHROCYTE [DISTWIDTH] IN BLOOD BY AUTOMATED COUNT: 13.6 % (ref 11.9–14.6)
ERYTHROCYTE [DISTWIDTH] IN BLOOD BY AUTOMATED COUNT: 13.7 % (ref 11.9–14.6)
EST. AVERAGE GLUCOSE BLD GHB EST-MCNC: 303 MG/DL
GLUCOSE BLD STRIP.AUTO-MCNC: 124 MG/DL (ref 65–100)
GLUCOSE BLD STRIP.AUTO-MCNC: 255 MG/DL (ref 65–100)
GLUCOSE BLD STRIP.AUTO-MCNC: 268 MG/DL (ref 65–100)
GLUCOSE BLD STRIP.AUTO-MCNC: 287 MG/DL (ref 65–100)
GLUCOSE SERPL-MCNC: 179 MG/DL (ref 65–100)
GLUCOSE SERPL-MCNC: 76 MG/DL (ref 65–100)
HBA1C MFR BLD: 12.2 % (ref 4.8–6)
HCT VFR BLD AUTO: 38.4 % (ref 41.1–50.3)
HCT VFR BLD AUTO: 41.1 % (ref 41.1–50.3)
HDLC SERPL-MCNC: 39 MG/DL (ref 40–60)
HDLC SERPL: 4.2 {RATIO}
HGB BLD-MCNC: 12.9 G/DL (ref 13.6–17.2)
HGB BLD-MCNC: 13.2 G/DL (ref 13.6–17.2)
IMM GRANULOCYTES # BLD AUTO: 0 K/UL (ref 0–0.5)
IMM GRANULOCYTES NFR BLD AUTO: 0 % (ref 0–5)
LDLC SERPL CALC-MCNC: 94 MG/DL
LIPID PROFILE,FLP: ABNORMAL
LYMPHOCYTES # BLD: 5.7 K/UL (ref 0.5–4.6)
LYMPHOCYTES NFR BLD: 46 % (ref 13–44)
MCH RBC QN AUTO: 27 PG (ref 26.1–32.9)
MCH RBC QN AUTO: 28 PG (ref 26.1–32.9)
MCHC RBC AUTO-ENTMCNC: 32.1 G/DL (ref 31.4–35)
MCHC RBC AUTO-ENTMCNC: 33.6 G/DL (ref 31.4–35)
MCV RBC AUTO: 83.3 FL (ref 79.6–97.8)
MCV RBC AUTO: 84.2 FL (ref 79.6–97.8)
MONOCYTES # BLD: 0.5 K/UL (ref 0.1–1.3)
MONOCYTES NFR BLD: 4 % (ref 4–12)
NEUTS SEG # BLD: 5.9 K/UL (ref 1.7–8.2)
NEUTS SEG NFR BLD: 48 % (ref 43–78)
NRBC # BLD: 0 K/UL (ref 0–0.2)
NRBC # BLD: 0 K/UL (ref 0–0.2)
P-R INTERVAL, ECG05: 158 MS
P-R INTERVAL, ECG05: 192 MS
P-R INTERVAL, ECG05: 210 MS
PLATELET # BLD AUTO: 235 K/UL (ref 150–450)
PLATELET # BLD AUTO: 265 K/UL (ref 150–450)
PLATELET COMMENTS,PCOM: ADEQUATE
PMV BLD AUTO: 10 FL (ref 9.4–12.3)
PMV BLD AUTO: 9.6 FL (ref 9.4–12.3)
POTASSIUM SERPL-SCNC: 3.2 MMOL/L (ref 3.5–5.1)
POTASSIUM SERPL-SCNC: 4.8 MMOL/L (ref 3.5–5.1)
Q-T INTERVAL, ECG07: 386 MS
Q-T INTERVAL, ECG07: 388 MS
Q-T INTERVAL, ECG07: 424 MS
QRS DURATION, ECG06: 112 MS
QRS DURATION, ECG06: 98 MS
QRS DURATION, ECG06: 98 MS
QTC CALCULATION (BEZET), ECG08: 464 MS
QTC CALCULATION (BEZET), ECG08: 469 MS
QTC CALCULATION (BEZET), ECG08: 477 MS
RBC # BLD AUTO: 4.61 M/UL (ref 4.23–5.6)
RBC # BLD AUTO: 4.88 M/UL (ref 4.23–5.6)
RBC MORPH BLD: ABNORMAL
SODIUM SERPL-SCNC: 141 MMOL/L (ref 136–145)
SODIUM SERPL-SCNC: 144 MMOL/L (ref 136–145)
TRIGL SERPL-MCNC: 145 MG/DL (ref 35–150)
TROPONIN-HIGH SENSITIVITY: ABNORMAL PG/ML (ref 0–14)
TSH SERPL DL<=0.005 MIU/L-ACNC: 0.35 UIU/ML (ref 0.36–3.74)
VENTRICULAR RATE, ECG03: 72 BPM
VENTRICULAR RATE, ECG03: 88 BPM
VENTRICULAR RATE, ECG03: 92 BPM
VLDLC SERPL CALC-MCNC: 29 MG/DL (ref 6–23)
WBC # BLD AUTO: 12 K/UL (ref 4.3–11.1)
WBC # BLD AUTO: 12.3 K/UL (ref 4.3–11.1)
WBC MORPH BLD: ABNORMAL

## 2020-06-22 PROCEDURE — 74011250637 HC RX REV CODE- 250/637: Performed by: NURSE PRACTITIONER

## 2020-06-22 PROCEDURE — 74011250637 HC RX REV CODE- 250/637: Performed by: INTERNAL MEDICINE

## 2020-06-22 PROCEDURE — 65660000000 HC RM CCU STEPDOWN

## 2020-06-22 PROCEDURE — 80048 BASIC METABOLIC PNL TOTAL CA: CPT

## 2020-06-22 PROCEDURE — 82962 GLUCOSE BLOOD TEST: CPT

## 2020-06-22 PROCEDURE — 74011000250 HC RX REV CODE- 250: Performed by: INTERNAL MEDICINE

## 2020-06-22 PROCEDURE — 85027 COMPLETE CBC AUTOMATED: CPT

## 2020-06-22 PROCEDURE — 93005 ELECTROCARDIOGRAM TRACING: CPT | Performed by: NURSE PRACTITIONER

## 2020-06-22 PROCEDURE — 74011250636 HC RX REV CODE- 250/636: Performed by: INTERNAL MEDICINE

## 2020-06-22 PROCEDURE — C8929 TTE W OR WO FOL WCON,DOPPLER: HCPCS

## 2020-06-22 PROCEDURE — 74011250636 HC RX REV CODE- 250/636: Performed by: NURSE PRACTITIONER

## 2020-06-22 PROCEDURE — 74011636637 HC RX REV CODE- 636/637: Performed by: NURSE PRACTITIONER

## 2020-06-22 RX ORDER — POTASSIUM CHLORIDE 20 MEQ/1
40 TABLET, EXTENDED RELEASE ORAL 2 TIMES DAILY
Status: COMPLETED | OUTPATIENT
Start: 2020-06-22 | End: 2020-06-22

## 2020-06-22 RX ORDER — POTASSIUM CHLORIDE 14.9 MG/ML
20 INJECTION INTRAVENOUS
Status: COMPLETED | OUTPATIENT
Start: 2020-06-22 | End: 2020-06-22

## 2020-06-22 RX ORDER — CARVEDILOL 3.12 MG/1
3.12 TABLET ORAL 2 TIMES DAILY WITH MEALS
Status: DISCONTINUED | OUTPATIENT
Start: 2020-06-22 | End: 2020-06-23 | Stop reason: HOSPADM

## 2020-06-22 RX ADMIN — POTASSIUM CHLORIDE 40 MEQ: 20 TABLET, EXTENDED RELEASE ORAL at 08:09

## 2020-06-22 RX ADMIN — PERFLUTREN 1 ML: 6.52 INJECTION, SUSPENSION INTRAVENOUS at 10:00

## 2020-06-22 RX ADMIN — Medication 5 ML: at 23:18

## 2020-06-22 RX ADMIN — HYDROCODONE BITARTRATE AND ACETAMINOPHEN 1 TABLET: 5; 325 TABLET ORAL at 17:31

## 2020-06-22 RX ADMIN — ATORVASTATIN CALCIUM 80 MG: 80 TABLET, FILM COATED ORAL at 23:14

## 2020-06-22 RX ADMIN — Medication 10 ML: at 13:16

## 2020-06-22 RX ADMIN — TICAGRELOR 90 MG: 90 TABLET ORAL at 07:12

## 2020-06-22 RX ADMIN — CARVEDILOL 3.12 MG: 3.12 TABLET, FILM COATED ORAL at 17:03

## 2020-06-22 RX ADMIN — ASPIRIN 81 MG 81 MG: 81 TABLET ORAL at 08:08

## 2020-06-22 RX ADMIN — CARVEDILOL 3.12 MG: 3.12 TABLET, FILM COATED ORAL at 08:08

## 2020-06-22 RX ADMIN — POTASSIUM CHLORIDE 20 MEQ: 200 INJECTION, SOLUTION INTRAVENOUS at 04:45

## 2020-06-22 RX ADMIN — INSULIN LISPRO 6 UNITS: 100 INJECTION, SOLUTION INTRAVENOUS; SUBCUTANEOUS at 13:16

## 2020-06-22 RX ADMIN — ONDANSETRON 4 MG: 2 INJECTION INTRAMUSCULAR; INTRAVENOUS at 12:02

## 2020-06-22 RX ADMIN — POTASSIUM CHLORIDE 40 MEQ: 20 TABLET, EXTENDED RELEASE ORAL at 17:03

## 2020-06-22 RX ADMIN — HYDROCODONE BITARTRATE AND ACETAMINOPHEN 1 TABLET: 5; 325 TABLET ORAL at 12:02

## 2020-06-22 RX ADMIN — SODIUM CHLORIDE 75 ML/HR: 9 INJECTION, SOLUTION INTRAVENOUS at 10:04

## 2020-06-22 RX ADMIN — LISINOPRIL 5 MG: 5 TABLET ORAL at 08:08

## 2020-06-22 RX ADMIN — PANTOPRAZOLE SODIUM 40 MG: 40 TABLET, DELAYED RELEASE ORAL at 07:12

## 2020-06-22 RX ADMIN — TICAGRELOR 90 MG: 90 TABLET ORAL at 19:41

## 2020-06-22 RX ADMIN — INSULIN LISPRO 6 UNITS: 100 INJECTION, SOLUTION INTRAVENOUS; SUBCUTANEOUS at 23:16

## 2020-06-22 RX ADMIN — Medication 10 ML: at 06:00

## 2020-06-22 RX ADMIN — POTASSIUM CHLORIDE 20 MEQ: 200 INJECTION, SOLUTION INTRAVENOUS at 08:07

## 2020-06-22 RX ADMIN — INSULIN LISPRO 6 UNITS: 100 INJECTION, SOLUTION INTRAVENOUS; SUBCUTANEOUS at 17:03

## 2020-06-22 NOTE — PROGRESS NOTES
Patient to ICU from cath lab. TR band to right radial artery. Site C/D/I with no bleeding or hematoma noted. No pressure injury noted. Dual skin assessment performed with Pablito Gutierrez RN, and allevyn placed on sacrum.

## 2020-06-22 NOTE — PROGRESS NOTES
TRANSFER - IN REPORT:    Verbal report received from Chapincito Zamudio RN on Hazel Joya being received from ICU (at [phone]) for routine progression of care. Report consisted of patients Situation, Background, Assessment and Recommendations(SBAR). Information from the following report(s) SBAR, Kardex, ED Summary, Procedure Summary, MAR and Cardiac Rhythm SR was reviewed. Opportunity for questions and clarification was provided. Assessment completed upon patients arrival to unit and care assumed. Patient received to room 306. Patient connected to monitor and assessment completed. Plan of care reviewed. Patient oriented to room and call light. Patient aware to use call light to communicate any chest pain or needs. Admission skin assessment completed with second RN and reveals the following: Heels are intact. NO edema in BLE. Right radial LHC site visualized. NO issues. Tegaderm with no gauze.

## 2020-06-22 NOTE — PROGRESS NOTES
Report received from [place], [address] [place] [address]. Patient A&Ox4, follows commands, eyes focus and track. Breath sounds clear, symmetrical chest expansion on room air. NSR on monitor, S1/S2 auscultated. Denies any chest pain or shortness of breath. TR Band site CDI, pulses palpable. Bowel sounds active, abdomen soft and intact. Skin intact. Lines: 18G L AC, 20G R AC  Drips: NS    Patient denies any pain at this time.

## 2020-06-22 NOTE — PROCEDURES
[facility]  CARDIAC CATH    Name:  Agustina Avalos  MR#:  [id number]  :  1970  ACCOUNT #:  [de-identified]  DATE OF SERVICE:  2020    PROCEDURES PERFORMED:  Left heart cath with coronary angiography and left ventriculogram, PTCA and stenting of the entire right coronary artery extending into the proximal posterior descending branch with rescue angioplasty to the posterolateral branch. PREOPERATIVE DIAGNOSIS:  Acute inferior myocardial infarction. POSTOPERATIVE DIAGNOSIS:  Acute inferior myocardial infarction. SURGEON:  Henry Crooks MD    ASSISTANT:  None. ESTIMATED BLOOD LOSS:  Less than 5 mL. SPECIMENS REMOVED:  None. COMPLICATIONS:  None. IMPLANTS:  See below. ANESTHESIA:  The patient was sedated with 2 mg Versed, 50 mcg fentanyl by Rene Ibarra, and monitored from 6:52 to 7:48 p.m. PROCEDURE TECHNIQUE:  The patient was emergently brought from the cath lab, prepped and draped in the usual fashion. A 6-Moldovan sheath was placed in the right radial artery via the micropuncture modified Seldinger technique and left heart catheterization performed using standard 5-Moldovan angled pigtail and Tiger catheters. Percutaneous intervention was performed through a 6-Moldovan JR4 guiding catheter utilizing heparin and Aggrastat for anticoagulation with a periprocedural ACT of 250 seconds. Aggrastat will be continued for 8 hours and the patient was loaded with 180 mg of Brilinta. Manual pressure will be applied to the patient's access site. There were no complications. PRESSURE RESULTS:  Left ventricle 115/15. Aorta 115/70. LEFT VENTRICULOGRAM:  Inferolateral hypokinesis with ejection fraction estimated to be about 45%. End-diastolic pressure is mildly elevated. There is no mitral regurgitation and no aortic valve gradient on catheter pullback.   Left ventricular end-diastolic pressure is high normal.    CORONARY ANATOMY:  The left main has mild irregularity, dividing into an LAD and circumflex in the usual fashion. The LAD wraps around the apex of the left ventricle and supplies a moderate-caliber first diagonal branch. The mid LAD has smooth disease to about 30% but the remainder of the LAD and the entire diagonal branch have mild luminal irregularity. The circumflex is a moderate-caliber nondominant system which supplies a fairly large obtuse marginal branch. There is a focal irregular 90% to 95% proximal circumflex stenosis which has ALEXANDRA 3 flow. The remainder of the circumflex and the obtuse marginal branch have mild irregularity. This will be staged for four to six weeks if the patient continues to do well post intervention to the right coronary artery. The right coronary artery is a dominant vessel supplying posterior descending and posterolateral branches. The right coronary artery, unfortunately, has diffuse disease extending from the proximal vessel all the way to the crux. The proximal vessel has a shelf-like 95% hazy stenosis. There is diffuse disease to about 70% to 80% in the mid vessel. The distal-most vessel has sluggish flow with a 99% thrombotic lesion at the crux involving the ostia of both the posterior descending and posterolateral branches with ALEXANDRA 2 flow. The midportion of the posterolateral branch has a 70% to 80% stenosis as well. The posterolateral branch has mild irregularity. PERCUTANEOUS INTERVENTION REPORT:  After systemic anticoagulation with heparin and Aggrastat and verification of a therapeutic ACT, a Runthrough wire was advanced into the distal posterior descending branch. We predilated from the ostium of the posterior descending branch all the way back to the proximal RCA with multiple inflations of a 2.5 compliant balloon to 16 atmospheres. This revealed improved flow but severe disease throughout the entire right coronary artery extending into the posterior descending.   We essentially had to stent the entire right coronary artery extending from the proximal right coronary artery into the mid to posterior descending branch, jailing the posterolateral branch. We initially placed a 2.5 x 30-mm [place] into the proximal posterior descending branch, jailing the posterolateral branch and deploying this from the distal right coronary artery proper into the posterior descending. We overlapped this with a more proximal 3.0 x 38-mm [place] and finally a very proximal third overlapping 3.0 x 38 [place] creating one long full metal jacket of three long stents extending from the proximal RCA into the proximal posterior descending. There was 95% ostial plaque shifting of the posterolateral branch but ALEXANDRA 2 flow. We wired this with a CoursePeer wire. We then performed kissing balloon intervention with a 2-mm compliant balloon in the posterolateral branch with a 2.5-mm noncompliant balloon in the posterior descending. Both balloons were dilated to 10-12 atmospheres. There was excellent angiographic appearance. We then post dilated the distal right coronary artery with a 2.5-mm noncompliant balloon to 16-20 atmospheres. There was good angiographic appearance of the right coronary artery proper all the way down to the crux. However, the posterior descending branch in the mid vessel has a hazy 80% stenosis at least.  We overlapped a final 2.0 x 18-mm Pancho with the more proximal posterior descending dilating up to 16 atmospheres and then 20 atmospheres at the overlap. We then finally post dilated the entire right coronary artery proper with a 3-mm noncompliant balloon to 16-20 atmospheres. There was an excellent angiographic result with less than 10% residual stenosis, no dissection and ALEXANDRA 3 flow. The patient tolerated the procedure well. He was loaded with Brilinta and will be continued on Aggrastat for 8 hours. CONCLUSIONS:  1.   Acute inferior MI status post PTCA and stenting of the entire right coronary artery into the mid posterior descending branch, with kissing balloon intervention to rescue the posterolateral branch. 2.  High-grade focal left circumflex stenosis which will be staged at a later date. 3.  Mildly depressed left ventricular systolic function.       Yo Oconnor MD      [id number]  D:  06/21/2020 20:09  T:  06/22/2020 8:36  JOB #:  [id number]  CC:  Radha Edwards MD

## 2020-06-22 NOTE — PROGRESS NOTES
6/22/2020 11:17 AM    Admit Date: 6/21/2020    Admit Diagnosis: STEMI (ST elevation myocardial infarction) ([facility] [I21.3]      Subjective:   No cp or sob- reports fatigue      Objective:      Visit Vitals  /71   Pulse 67   Temp 98.1 °F (36.7 °C)   Resp 17   Ht 5' 9\" (1.753 m)   Wt 77.6 kg (171 lb)   SpO2 98%   BMI 25.25 kg/m²       Physical Exam:  Fide Muniz, Well Nourished, No Acute Distress, Alert & Oriented x 3, appropriate mood. Neck- supple, no JVD  CV- regular rate and rhythm no MRG  Lung- clear bilaterally  Abd- soft, nontender, nondistended  Ext- no edema bilaterally. Skin- warm and dry        Data Review:   Recent Labs     06/22/20  0259 06/21/20  2326    141   K 3.2* 4.8   BUN 15 13   CREA 0.69* 0.89   WBC 12.0* 12.3*   HGB 12.9* 13.2*   HCT 38.4* 41.1    265   HDL  --  39*       Assessment/Plan:     Active Problems:    DM2 (diabetes mellitus, type 2) ([facility] (10/25/2015)      HTN (hypertension) (10/25/2015)      STEMI (ST elevation myocardial infarction) ([facility] (6/21/2020)/ CAd- Stable. Continue current medical therapy.    change to coreg with ef reduced and transfer to floor\  Hypokalemia- new - replete

## 2020-06-22 NOTE — PROGRESS NOTES
TRANSFER - IN REPORT:    Verbal report received from Patrizia RN (name) on Dulce Claude  being received from Cath lab (unit) for routine progression of care      Report consisted of patients Situation, Background, Assessment and   Recommendations(SBAR). Information from the following report(s) SBAR, Kardex, ED Summary, Procedure Summary, Intake/Output, MAR and Recent Results was reviewed with the receiving nurse. Opportunity for questions and clarification was provided. Assessment completed upon patients arrival to unit and care assumed.

## 2020-06-22 NOTE — PROGRESS NOTES
Interdisciplinary team rounds were held 6/22/2020 with the following team members:Nursing, Nurse Practitioner, Occupational Therapy, Palliative Care, Pastoral Care, Pharmacy, Physician, Respiratory Therapy and Clinical Coordinator and the patient. Plan of care discussed. See clinical pathway and/or care plan for interventions and desired outcomes.

## 2020-06-22 NOTE — ROUTINE PROCESS
Bedside and Verbal shift change report given to Claudell Mock, RN (oncoming nurse) by self Evelina moreland).  Report included the following information SBAR, Kardex, Intake/Output, MAR, Recent Results and Cardiac Rhythm SR.  
 
 
  
 
 
 
 
 Dermatology

## 2020-06-22 NOTE — PROGRESS NOTES
LEAPFROG EVALUATION: [facility]    The patient is currently in the ICU for Acute MI and bradycardia; he was admitted by cardiology and underwent LHC and PCI. Management by Dr Rolo Andersen.  Patient is currently hemodynamically stable. Patient has no needs identified for Intensivist management in the ICU setting at this time. Please notify us if can be of assistance. No charge billed to the patient. Thank you.     Visit Vitals  /64   Pulse 69   Temp 98.1 °F (36.7 °C)   Resp 19   Ht 5' 9\" (1.753 m)   Wt 171 lb (77.6 kg)   SpO2 98%   BMI 25.25 kg/m²         Anastacio Alvares, NP

## 2020-06-22 NOTE — PROGRESS NOTES
TRANSFER - OUT REPORT:    Verbal report given to 3rd floor RN(name) on Evi Gamino  being transferred to [facility](unit) for routine progression of care       Report consisted of patients Situation, Background, Assessment and   Recommendations(SBAR). Information from the following report(s) SBAR, Kardex, Procedure Summary, MAR and Cardiac Rhythm SR was reviewed with the receiving nurse. Lines:   Peripheral IV 06/21/20 Right Antecubital (Active)   Site Assessment Clean, dry, & intact 6/22/2020  7:00 AM   Phlebitis Assessment 0 6/22/2020  7:00 AM   Infiltration Assessment 0 6/22/2020  7:00 AM   Dressing Status Clean, dry, & intact 6/22/2020  7:00 AM   Dressing Type Tape;Transparent 6/22/2020  7:00 AM   Hub Color/Line Status Pink;Capped 6/22/2020  7:00 AM   Alcohol Cap Used No 6/22/2020  7:00 AM       Peripheral IV 06/21/20 Left Antecubital (Active)   Site Assessment Clean, dry, & intact 6/22/2020  7:00 AM   Phlebitis Assessment 0 6/22/2020  7:00 AM   Infiltration Assessment 0 6/22/2020  7:00 AM   Dressing Status Clean, dry, & intact 6/22/2020  7:00 AM   Dressing Type Tape;Transparent 6/22/2020  7:00 AM   Hub Color/Line Status Capped 6/22/2020  7:00 AM   Alcohol Cap Used No 6/22/2020  7:00 AM        Opportunity for questions and clarification was provided.       Patient transported with:   Monitor  Registered Nurse  Tech

## 2020-06-22 NOTE — PROGRESS NOTES
Bedside shift change report given to ROHIT Wheeler (oncoming nurse) by Walda Carrel, RN (offgoing nurse). Report included the following information SBAR, Kardex, ED Summary, Intake/Output, MAR and Recent Results.

## 2020-06-22 NOTE — ROUTINE PROCESS
Bedside and Verbal report given to self by [facility] RN. Report included SBAR, Kardex, ED Summary, Procedure Summary, Intake and Output and Cardiac Rhythm NSR. R-radial site assessed and dressing noted to be C/D/I.

## 2020-06-22 NOTE — PROGRESS NOTES
Initial visit was made, emotional support and a spiritual presence were provided.  card was left with the patient. He was sleeping.       RAMIREZ uV

## 2020-06-23 ENCOUNTER — APPOINTMENT (OUTPATIENT)
Dept: CT IMAGING | Age: 50
DRG: 174 | End: 2020-06-23
Attending: NURSE PRACTITIONER
Payer: MEDICAID

## 2020-06-23 VITALS
SYSTOLIC BLOOD PRESSURE: 99 MMHG | OXYGEN SATURATION: 95 % | HEIGHT: 69 IN | RESPIRATION RATE: 20 BRPM | HEART RATE: 87 BPM | WEIGHT: 171 LBS | DIASTOLIC BLOOD PRESSURE: 59 MMHG | TEMPERATURE: 96.8 F | BODY MASS INDEX: 25.33 KG/M2

## 2020-06-23 PROBLEM — I50.21 ACUTE HFREF (HEART FAILURE WITH REDUCED EJECTION FRACTION) (HCC): Status: ACTIVE | Noted: 2020-06-23

## 2020-06-23 LAB
ATRIAL RATE: 79 BPM
CALCULATED P AXIS, ECG09: 47 DEGREES
CALCULATED R AXIS, ECG10: -70 DEGREES
CALCULATED T AXIS, ECG11: 70 DEGREES
DIAGNOSIS, 93000: NORMAL
GLUCOSE BLD STRIP.AUTO-MCNC: 148 MG/DL (ref 65–100)
GLUCOSE BLD STRIP.AUTO-MCNC: 203 MG/DL (ref 65–100)
P-R INTERVAL, ECG05: 164 MS
Q-T INTERVAL, ECG07: 406 MS
QRS DURATION, ECG06: 100 MS
QTC CALCULATION (BEZET), ECG08: 465 MS
VENTRICULAR RATE, ECG03: 79 BPM

## 2020-06-23 PROCEDURE — 99238 HOSP IP/OBS DSCHRG MGMT 30/<: CPT | Performed by: INTERNAL MEDICINE

## 2020-06-23 PROCEDURE — 97161 PT EVAL LOW COMPLEX 20 MIN: CPT

## 2020-06-23 PROCEDURE — 74011250637 HC RX REV CODE- 250/637: Performed by: INTERNAL MEDICINE

## 2020-06-23 PROCEDURE — 74011000258 HC RX REV CODE- 258: Performed by: INTERNAL MEDICINE

## 2020-06-23 PROCEDURE — 0042T CT PERF W CBF: CPT

## 2020-06-23 PROCEDURE — 74011636320 HC RX REV CODE- 636/320: Performed by: INTERNAL MEDICINE

## 2020-06-23 PROCEDURE — 74011250636 HC RX REV CODE- 250/636: Performed by: NURSE PRACTITIONER

## 2020-06-23 PROCEDURE — 99223 1ST HOSP IP/OBS HIGH 75: CPT | Performed by: PSYCHIATRY & NEUROLOGY

## 2020-06-23 PROCEDURE — 74011250637 HC RX REV CODE- 250/637: Performed by: NURSE PRACTITIONER

## 2020-06-23 PROCEDURE — 74011636637 HC RX REV CODE- 636/637: Performed by: NURSE PRACTITIONER

## 2020-06-23 PROCEDURE — 70496 CT ANGIOGRAPHY HEAD: CPT

## 2020-06-23 PROCEDURE — 82962 GLUCOSE BLOOD TEST: CPT

## 2020-06-23 PROCEDURE — 70450 CT HEAD/BRAIN W/O DYE: CPT

## 2020-06-23 PROCEDURE — 93005 ELECTROCARDIOGRAM TRACING: CPT | Performed by: PHYSICIAN ASSISTANT

## 2020-06-23 RX ORDER — NITROGLYCERIN 0.4 MG/1
0.4 TABLET SUBLINGUAL
Qty: 1 BOTTLE | Refills: 1 | Status: SHIPPED | OUTPATIENT
Start: 2020-06-23 | End: 2022-02-24 | Stop reason: SDUPTHER

## 2020-06-23 RX ORDER — GUAIFENESIN 100 MG/5ML
81 LIQUID (ML) ORAL DAILY
Qty: 30 TAB | Refills: 0 | Status: SHIPPED | COMMUNITY
Start: 2020-06-24

## 2020-06-23 RX ORDER — CARVEDILOL 3.12 MG/1
3.12 TABLET ORAL 2 TIMES DAILY WITH MEALS
Qty: 60 TAB | Refills: 3 | Status: SHIPPED | OUTPATIENT
Start: 2020-06-23 | End: 2021-01-27 | Stop reason: SDUPTHER

## 2020-06-23 RX ORDER — ATORVASTATIN CALCIUM 80 MG/1
80 TABLET, FILM COATED ORAL
Qty: 30 TAB | Refills: 3 | Status: SHIPPED | OUTPATIENT
Start: 2020-06-23 | End: 2020-10-27

## 2020-06-23 RX ORDER — LISINOPRIL 5 MG/1
5 TABLET ORAL DAILY
Qty: 30 TAB | Refills: 3 | Status: SHIPPED | OUTPATIENT
Start: 2020-06-24 | End: 2020-10-27 | Stop reason: SDUPTHER

## 2020-06-23 RX ORDER — SODIUM CHLORIDE 0.9 % (FLUSH) 0.9 %
10 SYRINGE (ML) INJECTION
Status: COMPLETED | OUTPATIENT
Start: 2020-06-23 | End: 2020-06-23

## 2020-06-23 RX ADMIN — IOPAMIDOL 100 ML: 755 INJECTION, SOLUTION INTRAVENOUS at 11:52

## 2020-06-23 RX ADMIN — CARVEDILOL 3.12 MG: 3.12 TABLET, FILM COATED ORAL at 08:22

## 2020-06-23 RX ADMIN — SODIUM CHLORIDE 100 ML: 900 INJECTION, SOLUTION INTRAVENOUS at 11:52

## 2020-06-23 RX ADMIN — ACETAMINOPHEN 650 MG: 325 TABLET, FILM COATED ORAL at 12:28

## 2020-06-23 RX ADMIN — TICAGRELOR 90 MG: 90 TABLET ORAL at 08:22

## 2020-06-23 RX ADMIN — LISINOPRIL 5 MG: 5 TABLET ORAL at 08:22

## 2020-06-23 RX ADMIN — PANTOPRAZOLE SODIUM 40 MG: 40 TABLET, DELAYED RELEASE ORAL at 08:21

## 2020-06-23 RX ADMIN — ACETAMINOPHEN 650 MG: 325 TABLET, FILM COATED ORAL at 01:10

## 2020-06-23 RX ADMIN — Medication 10 ML: at 14:12

## 2020-06-23 RX ADMIN — SODIUM CHLORIDE 75 ML/HR: 9 INJECTION, SOLUTION INTRAVENOUS at 01:11

## 2020-06-23 RX ADMIN — INSULIN LISPRO 4 UNITS: 100 INJECTION, SOLUTION INTRAVENOUS; SUBCUTANEOUS at 12:29

## 2020-06-23 RX ADMIN — Medication 10 ML: at 11:52

## 2020-06-23 RX ADMIN — ASPIRIN 81 MG 81 MG: 81 TABLET ORAL at 08:21

## 2020-06-23 NOTE — PROGRESS NOTES
Problem: Mobility Impaired (Adult and Pediatric)  Goal: *Acute Goals and Plan of Care (Insert Text)  Description: No goals set due to patient not fully participating and does not want PT. Outcome: Progressing Towards Goal     PHYSICAL THERAPY: Initial Assessment and Discharge 6/23/2020  INPATIENT:    Payor: MEDICAID PENDING / Plan: Viviana Reyes PENDING / Product Type: Medicaid /       NAME/AGE/GENDER: Van Woodard is a 44 y.o. male   PRIMARY DIAGNOSIS: STEMI (ST elevation myocardial infarction) ([facility] [I21.3] <principal problem not specified> <principal problem not specified>        ICD-10: Treatment Diagnosis:    Other abnormalities of gait and mobility (R26.89)  Repeated Falls (R29.6)   Precaution/Allergies:  Bactrim [sulfamethoprim] and Sulfa (sulfonamide antibiotics)      ASSESSMENT:     Mr. Severa Marking presents supine in bed sleeping. Attempted to wake patient up with loud talking, sternal rub, but patient kept eyes closed. When asked questions, he did respond to some with short yes/no answers, but refused PT and kept his eyes closed. RN arrived and encouraged patient to participate with PT and he seemed to agree. Patient reports that he falls frequently due to \"passing out\". He did sit up independently. Gave patient slipper sox, but he refused to wear them. He did participate with MMT to LE's and B SHELLI's WFL and equal.  He refused to ambulate in hallways but stated that he needed to use the restroom. He stood independently and ambulated into restroom with modified independence holding onto furniture. He stood and urinated then washed his hands. Ambulated back to bed and lied down independently. Refused any further mobility. Evaluation limited today by patient's limited responses to questions and decreased participation. He seems to be functioning close to his baseline. Will discontinue skilled PT.     This section established at most recent assessment            REHAB RECOMMENDATIONS (at time of discharge pending progress):    Placement: It is my opinion, based on this patient's performance to date, that Mr. Bess Salas may benefit from being discharged with NO further skilled therapy due to patient does not want to fully participate with PT. Equipment:   None at this time              HISTORY:   History of Present Injury/Illness (Reason for Referral):  Per MD note,  \"51yo WM with no CAD history presents with acute severe unremitting CP and intermittent recurrent inferior ST elevation with bradycardia consistent with stuttering inferior MI.  Known DM Type II and dyslipidemia but takes no current meds. ? History of hepatitis C as well. Currently hemodynamically stable but still with severe CP in ER despite ASA, heparin, morphine. To cath lab stat. \"  Past Medical History/Comorbidities:   Mr. Bess Salas  has a past medical history of Diabetes ([address] Hypertension, and Other ill-defined conditions(799.89). Mr. Bess Salas  has a past surgical history that includes hx appendectomy and hx other surgical.  Social History/Living Environment:      Prior Level of Function/Work/Activity:  Ambulates independently without assistive device, unable to obtain much more information. Number of Personal Factors/Comorbidities that affect the Plan of Care: 1-2: MODERATE COMPLEXITY   EXAMINATION:   Most Recent Physical Functioning:   Gross Assessment:  AROM: Within functional limits  Strength: Within functional limits               Posture:  Posture (WDL): Exceptions to WDL  Posture Assessment:  Forward head, Rounded shoulders  Balance:  Sitting: Intact  Standing: Impaired  Standing - Static: Good  Standing - Dynamic : Fair Bed Mobility:  Rolling: Independent  Supine to Sit: Independent  Sit to Supine: Independent  Scooting: Independent  Wheelchair Mobility:     Transfers:  Sit to Stand: Independent  Stand to Sit: Independent  Gait:     Base of Support: Widened  Speed/Porsche: Slow  Step Length: Right shortened;Left shortened  Distance (ft): 25 Feet (ft)  Assistive Device: Other (comment)(holding onto furniture, etc.)  Ambulation - Level of Assistance: Modified independent      Body Structures Involved:  Heart Body Functions Affected:  Cardio Activities and Participation Affected:  None   Number of elements that affect the Plan of Care: 1-2: LOW COMPLEXITY   CLINICAL PRESENTATION:   Presentation: Stable and uncomplicated: LOW COMPLEXITY   CLINICAL DECISION MAKIN [facility] Mobility Inpatient Short Form  How much difficulty does the patient currently have. .. Unable A Lot A Little None   1. Turning over in bed (including adjusting bedclothes, sheets and blankets)? [] 1   [] 2   [] 3   [x] 4   2. Sitting down on and standing up from a chair with arms ( e.g., wheelchair, bedside commode, etc.)   [] 1   [] 2   [] 3   [x] 4   3. Moving from lying on back to sitting on the side of the bed? [] 1   [] 2   [] 3   [x] 4   How much help from another person does the patient currently need. .. Total A Lot A Little None   4. Moving to and from a bed to a chair (including a wheelchair)? [] 1   [] 2   [] 3   [x] 4   5. Need to walk in hospital room? [] 1   [] 2   [] 3   [x] 4   6. Climbing 3-5 steps with a railing? [] 1   [] 2   [] 3   [x] 4   © , Trustees of [employer], under license to [employer]. All rights reserved      Score:  Initial: 24 Most Recent: X (Date: -- )    Interpretation of Tool:  Represents activities that are increasingly more difficult (i.e. Bed mobility, Transfers, Gait). Use of outcome tool(s) and clinical judgement create a POC that gives a: Clear prediction of patient's progress: LOW COMPLEXITY            TREATMENT:   (In addition to Assessment/Re-Assessment sessions the following treatments were rendered)   Pre-treatment Symptoms/Complaints:  sleepy, hands feel swollen.   Pain: Initial:   Pain Intensity 1: 0  Post Session:  0 Assessment/Reassessment only, no treatment provided today    Braces/Orthotics/Lines/Etc:   O2 Device: Room air  Treatment/Session Assessment:    Response to Treatment:  see above. Interdisciplinary Collaboration:   Physical Therapist  Registered Nurse  After treatment position/precautions:   Supine in bed  Bed/Chair-wheels locked  Bed in low position  Call light within reach   Compliance with Program/Exercises: Noncompliant much of the time  .   Total Treatment Duration:  PT Patient Time In/Time Out  Time In: 1435  Time Out: Lynn Macias 78, PT, DPT

## 2020-06-23 NOTE — PROGRESS NOTES
Care Management Interventions  Mode of Transport at Discharge: Other (see comment)  Transition of Care Consult (CM Consult): Discharge Planning  Discharge Durable Medical Equipment: No  Physical Therapy Consult: No  Occupational Therapy Consult: No  Current Support Network: Lives Alone  Confirm Follow Up Transport: Family  Discharge Location  Discharge Placement: Home    Pt admitted to 3rd floor tele for STEMI. . CM met with pt to discuss CM needs & DCP. Pt is A&Ox4. Pt is indep at home with all ADLS. Pt lives alone in a rented apartment. 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 Pt has plans to apply to disability due to his DM. Pt states he's lost his support systems. Pt has no DME needs. Pt states he has no money to pay for medication. He doesn't' have a PCP but lives in [facility]. CM to voucher meds. Brilinta coupon given to pt. [place] [facility] DCP home. No further needs noted. CM to continue to monitor. 1430 CM noted clinical documentation and test results since Code S. CM to continue to follow for CM needs.

## 2020-06-23 NOTE — DISCHARGE SUMMARY
[facility] Cardiology Discharge Summary     Patient ID:  Cyndy Connors  [id number]  31 y.o.  1970    Admit date: 6/21/2020    Discharge date and time:  6/23/2020    Admitting Physician: Fili Noble MD     Discharge Physician: Dr. Ted Blum    Admission Diagnoses: STEMI (ST elevation myocardial infarction) [facility]) [I21.3]    Discharge Diagnoses:    Diagnosis    [facility]EF [facility] failure with reduced ejection fraction) [facility]    [facility]    Dyslipidemia associated with type 2 diabetes mellitus ([facility]    HTN (hypertension)    Type II or unspecified type diabetes mellitus without mention of complication, uncontrolled     Cardiology Procedures this admission:  Left heart catheterization with PCI  EchoCardiogram  Consults: Neurology    Hospital Course: Pt is a 43yo WM with no CAD history presented to [facility] ED with acute severe unremitting CP and intermittent recurrent inferior ST elevation with bradycardia consistent with stuttering inferior MI. Pt was taken emergently to the cath lab by Dr. Daksha Man. Pt was found to have LCX PROX FOCAL 90%, RCA PROX 95% SHELF, MID 90%, DISTAL 99% AT BIFURCATION, MID PDA 90% HAZY  and had a FULL METAL JACKET FROM PROX PDA TO PROX RCA OVERLAPPED 2.0 X18 ASHA MID PDA, 2.5 X 30 ONXY DISTAL RCA INTO PROX PDA, OVERLAPPING MID PDA STENT 3.0 X 38 ASHA IN MID AND PROX RCA TO CREATE LONG STENTED REGION FROM PROX RCA INTO MID PDA JAILED PLB WITH 90% STENOSIS, KISSED 2.0 IN PLB AND 2.5 IN PDA TO 12, GOOD RESULT. Pt tolerated the procedure well and was taken to the telemetry floor for recovery. ECHO on 6/21/20 showed Left ventricle: Systolic function was mildly reduced. Ejection fraction was estimated in the range of 40 % to 45 %. There was moderate inferior/inferolateral wall hypokinesis. Patient was started on Coreg and Lisinopril. On 6/23/20 Code S called. A CT of the head was obtained and was negative for acute changes.  CTA was obtained, which was negative for LVO. CTP was negative for infarct. Per Neurology- Degree of weakness of the left side is not specifically convincing however given the fact the patient has severe underlying coronary artery disease importance of excluding concomitant carotid disease and cardiac rhythm issues is self evidently highly important. In terms of current treatment he is on dual antiplatelet therapy and this is appropriate from a neurological standpoint. Neurology symptoms resolved. Neurology cleared for discharged on 6/23/2020. Pt's right radial cath site was clean, dry and intact without hematoma or bruit. Patient Pt was seen and examined by Dr. Shauna Martinez and determined stable and ready for discharge. After receiving drug eluting stents, the patient will remain on dual anti-platelet therapy for 1 year. For maximized medical therapy for CAD, patient will continue BB, ACE-I, and statin as well. The patient will follow up with [facility] Cardiology -- Dr. Kody Mg on Monday June 29th at 2:30 pm at [facility]. Patient has been referred to cardiac rehab. [facility] on Friday 6/26/20. DISPOSITION: The patient is being discharged home in stable condition on a low saturated fat, low cholesterol and low salt diet. Pt is instructed to advance activities as tolerated limited to fatigue or shortness of breath. Pt is instructed to do no heavy lifting, straining, stooping or squatting for 5-7 days. Pt is instructed to watch cath site for bleeding/oozing, if seen Pt is instructed to apply firm pressure with clean cloth and call office at [phone]. Pt is instructed to watch for signs of infection which include increasing area of redness around site, fever/hot to touch or purulent drainage. Pt is instructed not to soak in a tub bath for 1 week, but it is okay to shower. Pt is instructed to call office or return to ER for immediate evaluation of any shortness of breath or chest pain not relieved by NTG.     Discharge Exam:   Visit Vitals  /66   Pulse 85   Temp 97.6 °F (36.4 °C)   Resp 20   Ht 5' 9\" (1.753 m)   Wt 77.6 kg (171 lb)   SpO2 97%   BMI 25.25 kg/m²   Pt has been seen by [place]: see his progress note for exam details. Recent Results (from the past 24 hour(s))   GLUCOSE, POC    Collection Time: 06/22/20  4:26 PM   Result Value Ref Range    Glucose (POC) 268 (H) 65 - 100 mg/dL   GLUCOSE, POC    Collection Time: 06/22/20 10:59 PM   Result Value Ref Range    Glucose (POC) 287 (H) 65 - 100 mg/dL   GLUCOSE, POC    Collection Time: 06/23/20  6:17 AM   Result Value Ref Range    Glucose (POC) 148 (H) 65 - 100 mg/dL   GLUCOSE, POC    Collection Time: 06/23/20 11:18 AM   Result Value Ref Range    Glucose (POC) 203 (H) 65 - 100 mg/dL   EKG, 12 LEAD, INITIAL    Collection Time: 06/23/20 11:25 AM   Result Value Ref Range    Ventricular Rate 79 BPM    Atrial Rate 79 BPM    P-R Interval 164 ms    QRS Duration 100 ms    Q-T Interval 406 ms    QTC Calculation (Bezet) 465 ms    Calculated P Axis 47 degrees    Calculated R Axis -70 degrees    Calculated T Axis 70 degrees    Diagnosis       Normal sinus rhythm  Left anterior fascicular block  Abnormal ECG  When compared with ECG of 22-JUN-2020 07:09,  No significant change was found  Confirmed by GA EASTMAN (), Funmi Solano ([id number]) on 6/23/2020 12:06:46 PM       Patient Instructions:   Current Discharge Medication List      START taking these medications    Details   aspirin 81 mg chewable tablet Take 1 Tab by mouth daily. Qty: 30 Tab, Refills: 0      atorvastatin (LIPITOR) 80 mg tablet Take 1 Tab by mouth nightly. Qty: 30 Tab, Refills: 3      carvediloL (COREG) 3.125 mg tablet Take 1 Tab by mouth two (2) times daily (with meals). Qty: 60 Tab, Refills: 3      lisinopriL (PRINIVIL, ZESTRIL) 5 mg tablet Take 1 Tab by mouth daily. Qty: 30 Tab, Refills: 3      nitroglycerin (NITROSTAT) 0.4 mg SL tablet 1 Tab by SubLINGual route every five (5) minutes as needed for Chest Pain. Up to 3 doses.   Qty: 1 Bottle, Refills: 1      ticagrelor (BRILINTA) 90 mg tablet Take 1 Tab by mouth every twelve (12) hours every twelve (12) hours. Qty: 60 Tab, Refills: 11         CONTINUE these medications which have NOT CHANGED    Details   !! insulin aspart protamine/insulin aspart (NOVOLOG MIX 70-30) 100 unit/mL (70-30) injection 8-12 Units by SubCUTAneous route Daily (before dinner). !! insulin aspart protamine/insulin aspart (NOVOLOG MIX 70-30) 100 unit/mL (70-30) injection 18 Units by SubCUTAneous route Daily (before breakfast). !! - Potential duplicate medications found. Please discuss with provider.           JOSE RAMON Rivera  6/23/2020  7:42 AM

## 2020-06-23 NOTE — ROUTINE PROCESS
Cardiac Rehab: Spoke with patient regarding referral to cardiac rehab. Patient meets admission criteria based on STEMI with PCI (06/21/20). Written information about Cardiac Rehab given and reviewed with patient. Discussed lifestyle modifications to promote cardiac wellness. Patient indicated that he does not want to participate in the cardiac rehab program due to time, travel requirements and finances. He was given an application to the hospital financial assistance program to pursue if he wishes. He is aware that if approved for 100% hospital financial assistance, he could participate in the Cardiac Rehab program at no cost to him. His Cardiologist is Dr. Laila Castillo. Thank you, GIDEON RochaN, RN Cardiopulmonary Rehabilitation Nurse Liaison [facility]

## 2020-06-23 NOTE — PROGRESS NOTES
Quick Progress Note/Addition    Current Problem/Change in condition: The patient refused morning labs this morning and wanted to go home. He was prepared for discharge when he started to complaint of CP. EKG was ordered with no acute changes but around 1120 the patient started to complaint of right sided facial tingling while being helped to the bathroom. He soon stated he could not smile, and had noted left sided weakness of both arm and leg. The patient eyes were JOSÉ, no smile noted, but was a and o at the time. A Code S was called for further evaluation. Patient Vitals for the past 24 hrs:   Temp Pulse Resp BP SpO2   06/23/20 1134  85  105/66 97 %   06/23/20 1109  88  111/65    06/23/20 0809 97.6 °F (36.4 °C) 85 20 111/66 96 %   06/23/20 0527 98.4 °F (36.9 °C) 78 18 91/62 94 %   06/23/20 0048 98.8 °F (37.1 °C) 82 18 95/64 96 %   06/22/20 2053 99.1 °F (37.3 °C) 82 18 99/61 93 %   06/22/20 1740 99.4 °F (37.4 °C) 74 18 113/66 97 %   06/22/20 1212 97.9 °F (36.6 °C) 68 18 104/67 97 %         Physical Exam  Vitals signs and nursing note reviewed. Constitutional:       General: He is in acute distress. HENT:      Nose: Nose normal.      Mouth/Throat:      Mouth: Mucous membranes are moist.      Tongue: Tongue does not deviate from midline. Pharynx: Oropharynx is clear. Comments: PT states he cant smile which is new  Eyes:      General: Lids are normal.      Pupils: Pupils are equal, round, and reactive to light. Neck:      Musculoskeletal: Normal range of motion. Thyroid: No thyroid mass. Trachea: Trachea normal.   Cardiovascular:      Rate and Rhythm: Normal rate and regular rhythm. Pulses: Normal pulses. Heart sounds: Normal heart sounds, S1 normal and S2 normal.   Pulmonary:      Effort: Pulmonary effort is normal.      Breath sounds: Normal breath sounds.    Chest:      Breasts:         Right: Normal.         Left: Normal.   Abdominal:      General: Abdomen is flat.      Palpations: Abdomen is soft. Musculoskeletal:      Left shoulder: He exhibits decreased strength. Left hip: He exhibits decreased strength. Skin:     General: Skin is warm and dry. Capillary Refill: Capillary refill takes less than 2 seconds. Coloration: Skin is not pale. Neurological:      Mental Status: He is alert and oriented to person, place, and time. Sensory: Sensory deficit present. Motor: Weakness (left sided strength) present. Coordination: Coordination abnormal. Heel to Muse Test abnormal.   Psychiatric:         Attention and Perception: Attention and perception normal.         Mood and Affect: Mood normal.         Behavior: Behavior is uncooperative. Thought Content: Thought content normal.          Tx/Changes Performed: Code S Called with plans for CT of the head    Time Spent with patient 20 with more than 50% spent face to face.     Terese Mitchell NP  06/23/20  11:40 AM

## 2020-06-23 NOTE — ROUTINE PROCESS
Patient is refusing lab draws this morning despite nursing education as to why he needs to get them drawn before discharge. Patient stated he will not let anyone stick him anymore and he will leave AMA. JOSE RAMON Palma notified.

## 2020-06-23 NOTE — DISCHARGE INSTRUCTIONS
Patient Education   Patient Education        Taking Aspirin and Other Antiplatelets Safely: Care Instructions  Your Care Instructions     Aspirin and other antiplatelet medicines help prevent blood clots from forming. They can help some people lower their risk of a heart attack or stroke. But these medicines can also make you more likely to bleed. That's why it's important to talk to your doctor before you start taking aspirin every day. It's not right for everyone. And if you and your doctor decide these medicines are right for you, learn how to take them safely. If you take aspirin, be sure you know how to take it. Your doctor can tell you what dose to take and how often to take it. One low-dose aspirin is 81 milligrams (mg). But the dose for daily aspirin can range from 81 mg to 325 mg. If you take another antiplatelet, take it as prescribed. Follow-up care is a key part of your treatment and safety. Be sure to make and go to all appointments, and call your doctor if you are having problems. It's also a good idea to know your test results and keep a list of the medicines you take. How can you care for yourself at home? · Before you start to take daily aspirin or some other antiplatelet, tell your doctor all the medicines, vitamins, herbal products, and supplements you take. · Tell your doctors, dentist, and pharmacist that you take an antiplatelet. · Take your medicine as your doctor directs. Make sure that you understand exactly what your doctor wants you to do. If another doctor says to stop taking the medicine for any reason, talk to the doctor who prescribed it before you stop. · Take your medicine at the same time every day. · Do not chew or crush the coated or time-release forms of your medicine. · If you miss a dose, don't take an extra dose to make up for it. · Ask your doctor whether you can drink alcohol. And ask how much you can drink.  When you take an antiplatelet, drinking too much raises your risk for liver damage and stomach bleeding. · If you are pregnant, are breastfeeding, or plan to become pregnant, talk to your doctor about what medicines are safe. · Talk with your doctor before you take a pain medicine. Many pain medicines have aspirin. Too much aspirin can be harmful. · Wear medical alert jewelry. This lets others know that you take an antiplatelet. You can buy it at most drugstores. · Try to avoid injuries that might make you bleed. For example, be careful when you exercise and when you play sports. Make your home safe to reduce your risk of falling. When should you call for help? Call 911 anytime you think you may need emergency care. For example, call if:  · You have a sudden, severe headache that is different from past headaches. Call your doctor now or seek immediate medical care if:  · You have any abnormal bleeding, such as:  ? A nosebleed that you can't easily stop. ? Bloody or black stools, or rectal bleeding. ? Bloody or pink urine. · You feel dizzy or lightheaded or feel like you may faint. Watch closely for changes in your health, and be sure to contact your doctor if you have any problems. Where can you learn more? Go to http://xochitl-ming.info/  Enter D885 in the search box to learn more about \"Taking Aspirin and Other Antiplatelets Safely: Care Instructions. \"  Current as of: December 16, 2019               Content Version: 12.5  © 1754-2703 [employer]. Care instructions adapted under license by [employer] (which disclaims liability or warranty for this information). If you have questions about a medical condition or this instruction, always ask your healthcare professional. [address] any warranty or liability for your use of this information. Angina: Care Instructions  Your Care Instructions     You have a problem called angina.  Angina happens when there is not enough blood flow to your heart muscle. Angina is a sign of coronary artery disease (CAD). CAD occurs when blood vessels that supply the heart become narrowed. Having CAD increases your risk of a heart attack. Chest pain or pressure is the most common symptom of angina. But some people have other symptoms, like:  · Pain, pressure, or a strange feeling in the back, neck, jaw, or upper belly, or in one or both shoulders or arms. · Shortness of breath. · Nausea or vomiting. · Lightheadedness or sudden weakness. · Fast or irregular heartbeat. Women are somewhat more likely than men to have angina symptoms like shortness of breath, nausea, and back or jaw pain. Angina can be dangerous. That's why it is important to pay attention to your symptoms. Know what is typical for you, learn how to control your symptoms, and understand when you need to get treatment. A change in your usual pattern of symptoms is an emergency. It may mean that you are having a heart attack. The doctor has checked you carefully, but problems can develop later. If you notice any problems or new symptoms, get medical treatment right away. Follow-up care is a key part of your treatment and safety. Be sure to make and go to all appointments, and call your doctor if you are having problems. It's also a good idea to know your test results and keep a list of the medicines you take. How can you care for yourself at home? Medicines  · If your doctor has given you nitroglycerin for angina symptoms, keep it with you at all times. If you have symptoms, sit down and rest, and take the first dose of nitroglycerin as directed. If your symptoms get worse or are not getting better within 5 minutes, call 911 right away. Stay on the phone. The emergency  will give you further instructions. · If your doctor advises it, take 1 low-dose aspirin a day to prevent heart attack. · Be safe with medicines. Take your medicines exactly as prescribed.  Call your doctor if you think you are having a problem with your medicine. You will get more details on the specific medicines your doctor prescribes. Lifestyle changes  · Do not smoke. If you need help quitting, talk to your doctor about stop-smoking programs and medicines. These can increase your chances of quitting for good. · Eat a heart-healthy diet that is low in saturated fat and salt, and is high in fiber. Talk to your doctor or a dietitian about healthy eating. · Stay at a healthy weight. Or lose weight if you need to. Activity  · Talk to your doctor about a level of activity that is safe for you. · If an activity causes angina symptoms, stop and rest.  When should you call for help? [id number] anytime you think you may need emergency care. For example, call if:  · You passed out (lost consciousness). · You have symptoms of a heart attack. These may include:  ? Chest pain or pressure, or a strange feeling in the chest.  ? Sweating. ? Shortness of breath. ? Nausea or vomiting. ? Pain, pressure, or a strange feeling in the back, neck, jaw, or upper belly or in one or both shoulders or arms. ? Lightheadedness or sudden weakness. ? A fast or irregular heartbeat. After you call 911, the  may tell you to chew 1 adult-strength or 2 to 4 low-dose aspirin. Wait for an ambulance. Do not try to drive yourself. · You have angina symptoms that do not go away with rest or are not getting better within 5 minutes after you take a dose of nitroglycerin. Call your doctor now if:  · Your angina symptoms seem worse but still follow your typical pattern. You can predict when symptoms will happen, but they may come on sooner, feel worse, or last longer. · You feel dizzy or lightheaded, or you feel like you may faint. Watch closely for changes in your health, and be sure to contact your doctor if you have any problems. Where can you learn more?   Go to http://xochitl-ming.info/  Enter H129 in the search box to learn more about \"Angina: Care Instructions. \"  Current as of: December 16, 2019               Content Version: 12.5  © 9023-5189 [employer]. Care instructions adapted under license by [employer] (which disclaims liability or warranty for this information). If you have questions about a medical condition or this instruction, always ask your healthcare professional. [address] any warranty or liability for your use of this information. Patient Education        Statins: Care Instructions  Your Care Instructions     Statins are medicines that lower your cholesterol and your risk for a heart attack and stroke. Cholesterol is a type of fat in your blood. If you have too much cholesterol, it can build up in blood vessels. This raises your risk of heart disease, heart attack, and stroke. Statins lower cholesterol by blocking how much your body makes. This prevents cholesterol from building up in your blood vessels. This is called hardening of the arteries. It is the starting point for some heart and blood flow problems, such as heart disease. Statins may also reduce inflammation around the buildup (called plaque). This can lower the risk that the plaque will break apart and lead to a heart attack or stroke. A heart-healthy lifestyle is important for lowering your risk whether you take statins or not. This includes eating healthy foods, being active, staying at a healthy weight, and not smoking. You must take statins regularly for them to work well. If you stop, your cholesterol and your risk will go back up. Examples of statins include:  · Atorvastatin (Lipitor). · Lovastatin (Mevacor). · Pravastatin (Pravachol). · Simvastatin (Zocor). Statins interact with many medicines. So tell your doctor all of the other medicines that you take. These include prescription medicines, over-the-counter medicines, dietary supplements, and herbal products.   Follow-up care is a key part of your treatment and safety. Be sure to make and go to all appointments, and call your doctor if you are having problems. It's also a good idea to know your test results and keep a list of the medicines you take. How can you care for yourself at home? · Take statins exactly as your doctor tells you. High cholesterol has no symptoms. So it is easy to forget to take the pills. Try to make a system that reminds you to take them. · Do not take two or more medicines at the same time unless the doctor told you to. Statins can interact with other medicines. · Always tell your doctor if you think you are having a side effect. If side effects are a problem with one medicine, a different one may be used. · Keep making the lifestyle changes your doctor suggests. Eat heart-healthy foods, be active, don't smoke, and stay at a healthy weight. · Talk to your doctor about avoiding grapefruit juice if you take statins. Grapefruit juice can raise the level of this medicine in your blood. This could increase side effects. When should you call for help? Watch closely for changes in your health, and be sure to contact your doctor if:  · You think you are having problems with your medicine. · You have aches or muscle pain. Where can you learn more? Go to http://xochitl-ming.info/  Enter R358 in the search box to learn more about \"Statins: Care Instructions. \"  Current as of: December 16, 2019               Content Version: 12.5  © 7005-5679 [employer]. Care instructions adapted under license by [employer] (which disclaims liability or warranty for this information). If you have questions about a medical condition or this instruction, always ask your healthcare professional. [place] [address] any warranty or liability for your use of this information.        Patient Education   Carvedilol (By mouth)   Carvedilol (pms-VF-mda-ol)  Treats high blood pressure and heart failure. Also reduces the risk of death after a heart attack. This medicine is a beta-blocker. Brand Name(s): Coreg, Coreg CR   There may be other brand names for this medicine. When This Medicine Should Not Be Used: This medicine is not right for everyone. Do not use it if you had an allergic reaction to carvedilol, or if you have asthma, severe liver disease, or certain heart problems. Ask your doctor about these heart problems. How to Use This Medicine:   Long Acting Capsule, Tablet  · Take your medicine as directed. Your dose may need to be changed several times to find what works best for you. · It is best to take this medicine with food or milk. · Extended-release capsule instructions:   ¨ Take the capsule in the morning with food. ¨ Swallow the capsule whole. Do not crush or chew it. ¨ If you cannot swallow the capsule, you may open it and sprinkle the medicine over a spoonful of applesauce. Swallow the applesauce right away. · Read and follow the patient instructions that come with this medicine. Talk to your doctor or pharmacist if you have any questions. · Store the medicine in a closed container at room temperature, away from heat, moisture, and direct light. · Missed dose: Take a dose as soon as you remember. If it is almost time for your next dose, wait until then and take a regular dose. Do not take extra medicine to make up for a missed dose. Drugs and Foods to Avoid:   Ask your doctor or pharmacist before using any other medicine, including over-the-counter medicines, vitamins, and herbal products. · Some medicines can affect how carvedilol works. Tell your doctor if you are using amiodarone, clonidine, diltiazem, cyclosporine, digoxin, fluconazole, reserpine, rifampin, verapamil, or an MAO inhibitor (MAOI).   Warnings While Using This Medicine:   · Tell your doctor if you are pregnant or breastfeeding, or if you have kidney disease, liver disease, bradycardia (slow heartbeat), coronary artery disease, circulation problems, edema (fluid retention or swelling), heart or blood vessel problems, low blood pressure, lung problems (such as bronchitis or emphysema), an overactive thyroid, pheochromocytoma, or frequent chest pains. Tell your doctor if you have a history of severe allergic reactions or if you are scheduled to have surgery. · This medicine may cause the following problems:   ¨ Changes to your blood sugar level (if you have diabetes, report any blood sugar level changes to your doctor)  ¨ Fewer tears than usual in contact lens wearers  ¨ An eye problem called Intraoperative Floppy Iris Syndrome during cataract surgery  · This medicine may make you dizzy or drowsy. Do not drive, use machines, or do anything else that could be dangerous if you are not alert. · Do not stop using this medicine suddenly. Your doctor will need to slowly decrease your dose before you stop it completely. · Keep all medicine out of the reach of children. Never share your medicine with anyone.   Possible Side Effects While Using This Medicine:   Call your doctor right away if you notice any of these side effects:  · Allergic reaction: Itching or hives, swelling in your face or hands, swelling or tingling in your mouth or throat, chest tightness, trouble breathing  · Change in how much or how often you urinate  · Chest pain that may spread to your arms, jaw, back, or neck, trouble breathing, nausea, unusual sweating, faintness  · Leg pain when you walk, legs and feet that feel cold or numb  · Lightheadedness, dizziness, or fainting  · Rapid weight gain, swelling in your hands, ankles, or feet  · Shaking, trembling, sweating, hunger, confusion  · Slow, fast, or uneven heartbeat  · Unusual bleeding or bruising  · Wheezing or trouble breathing  If you notice these less serious side effects, talk with your doctor:   · Diarrhea  · Trouble having sex  · Unusual tiredness or weakness  If you notice other side effects that you think are caused by this medicine, tell your doctor. Call your doctor for medical advice about side effects. You may report side effects to FDA at 4-307-QRW-6839  © 2017 [facility] Information is for End User's use only and may not be sold, redistributed or otherwise used for commercial purposes. The above information is an  only. It is not intended as medical advice for individual conditions or treatments. Talk to your doctor, nurse or pharmacist before following any medical regimen to see if it is safe and effective for you. Patient Education   Lisinopril (By mouth)   Lisinopril (lye-SIN-oh-pril)  Treats high blood pressure and heart failure. Also given to reduce the risk of death after a heart attack. This medicine is an ACE inhibitor. Brand Name(s): Prinivil, Qbrelis, Zestril   There may be other brand names for this medicine. When This Medicine Should Not Be Used: This medicine is not right for everyone. Do not use it if you had an allergic reaction to lisinopril or another ACE inhibitor, or if you are pregnant. How to Use This Medicine:   Liquid, Tablet  Take your medicine as directed. Your dose may need to be changed several times to find what works best for you. Oral liquid: Measure the oral liquid medicine with a marked measuring spoon, oral syringe, or medicine cup. Missed dose: Take a dose as soon as you remember. If it is almost time for your next dose, wait until then and take a regular dose. Do not take extra medicine to make up for a missed dose. Store the medicine in a closed container at room temperature, away from heat, moisture, and direct light. Drugs and Foods to Avoid:   Ask your doctor or pharmacist before using any other medicine, including over-the-counter medicines, vitamins, and herbal products. Do not use this medicine together with aliskiren if you have diabetes.   Some foods and medicines may affect how lisinopril works. Tell your doctor if you are using any of the following:   Aliskiren, everolimus, lithium, sirolimus, temsirolimus  Another blood pressure medicine, including an angiotensin receptor blocker (ARB)  Diuretic (water pill, including amiloride, spironolactone, triamterene)  Insulin or diabetes medicine  NSAID pain or arthritis medicine (including aspirin, celecoxib, diclofenac, ibuprofen, naproxen)  Ask your doctor before you use any medicine, supplement, or salt substitute that contains potassium. Warnings While Using This Medicine: It is not safe to take this medicine during pregnancy. It could harm an unborn baby. Tell your doctor right away if you become pregnant. Tell your doctor if you are breastfeeding, or if you have kidney disease, liver disease, diabetes, or heart or blood vessel disease. This medicine may cause the following problems:  Angioedema (severe swelling)  Kidney problems  Serious liver problems  This medicine could lower your blood pressure too much, especially when you first use it or if you are dehydrated. Stand or sit up slowly if you feel lightheaded or dizzy. Do not stop using this medicine without asking your doctor, even if you feel well. This medicine will not cure your high blood pressure, but it will help keep it in a normal range. You may have to take blood pressure medicine for the rest of your life. Tell any doctor or dentist who treats you that you are using this medicine. Your doctor will do lab tests at regular visits to check on the effects of this medicine. Keep all appointments. Keep all medicine out of the reach of children. Never share your medicine with anyone. Possible Side Effects While Using This Medicine:   Call your doctor right away if you notice any of these side effects:   Allergic reaction: Itching or hives, swelling in your face or hands, swelling or tingling in your mouth or throat, chest tightness, trouble breathing  Blistering, peeling, or red skin rash  Change in how much or how often you urinate  Confusion, weakness, uneven heartbeat, trouble breathing, numbness or tingling in your hands, feet, or lips  Dark urine or pale stools, nausea, vomiting, loss of appetite, stomach pain, yellow skin or eyes  Fever, chills, sore throat, body aches  Lightheadedness, dizziness, fainting  Severe stomach pain (with or without nausea or vomiting)  If you notice these less serious side effects, talk with your doctor:   Dry cough  If you notice other side effects that you think are caused by this medicine, tell your doctor. Call your doctor for medical advice about side effects. You may report side effects to FDA at 5-438-JNO-5633  © 2017 [facility] [place] Information is for End User's use only and may not be sold, redistributed or otherwise used for commercial purposes. The above information is an  only. It is not intended as medical advice for individual conditions or treatments. Talk to your doctor, nurse or pharmacist before following any medical regimen to see if it is safe and effective for you. Patient Education        Learning About Acid-Reducing Medicines  What are they? Acid-reducing medicines can help relieve heartburn and other symptoms of indigestion. They can help prevent damage to your digestive system from stomach acids. They also are used to treat reflux and ulcer symptoms. These medicines include H2 blockers and proton pump inhibitors (PPIs). They help your stomach make less acid. You can buy them over the counter. Some of them also come in prescription strengths. Antacids can also help relieve heartburn symptoms. They reduce the acid that is already in your stomach. You can buy them over the counter. Which medicine is best for you depends on what is causing your symptoms. How do they work? Acid-reducing medicines work in two ways.  H2 blockers and proton pump inhibitors (PPIs) lower the amount of acid your stomach makes. They don't work on the acid that's already there. Antacids work by making stomach juices less acidic. But your heartburn may come back as your stomach makes more acid. What are some examples? Examples of acid reducers include:  H2 blockers. Tagamet (cimetidine)  Pepcid (famotidine)  Proton pump inhibitors (PPIs). Nexium (esomeprazole)  Prevacid (lansoprazole)  Prilosec, Zegerid (omeprazole)  Protonix (pantoprazole)  Aciphex (rabeprazole)  Antacids. Gaviscon  Mylanta  Maalox  Tums  What are side effects might you have? Many people don't have side effects. And minor side effects might go away after a while. H2 blockers can cause headaches or make you dizzy. They might cause diarrhea or constipation. You may have nausea and vomiting. PPIs can cause headaches and diarrhea. Using them for a long time may raise your risk for infections or broken bones. Some antacids can cause constipation or diarrhea. The brands vary in the ingredients they use. They can have different side effects. If you use too much heartburn medicine, your body may not get enough of some minerals from your food. How can you take these medicines safely? Some H2 blockers and PPIs can affect how other medicines work. Tell your doctor if you use other medicines. He or she may change the dose or give you a different medicine. Many antacids have aspirin in them. Read the label to make sure that you don't take too much. Too much aspirin can be harmful. Be safe with medicines. Take your medicines exactly as prescribed. If you take over-the-counter medicine, be sure to read and follow all instructions on the label. Call your doctor if you think you are having a problem with your medicine. Check with your doctor or pharmacist before you use any other medicines. This includes over-the-counter medicines. Tell your doctor about all of the medicines, vitamins, herbal products, and supplements you take.  Taking some medicines together can cause problems. Follow-up care is a key part of your treatment and safety. Be sure to make and go to all appointments, and call your doctor if you are having problems. It's also a good idea to know your test results and keep a list of the medicines you take. Where can you learn more? Go to http://xochitl-ming.info/  Enter A155 in the search box to learn more about \"Learning About Acid-Reducing Medicines. \"  Current as of: August 12, 2019               Content Version: 12.5  © 9560-5549 [employer]. Care instructions adapted under license by [employer] (which disclaims liability or warranty for this information). If you have questions about a medical condition or this instruction, always ask your healthcare professional. [address] any warranty or liability for your use of this information. Patient Education      Ticagrelor (Brilinta) - (By mouth)   Why this medicine is used:   Helps prevent stroke, heart attack, and other heart problems. Contact a nurse or doctor right away if you have:  · Sudden or severe headache  · Shortness of breath, trouble breathing  · Bloody vomit or vomit that looks like coffee grounds; bloody or black, tarry stools  · Bleeding that does not stop or bruises that do not heal     Common side effects:  · Minor bleeding or bruising  · Headache  © 2017 [address] is for End User's use only and may not be sold, redistributed or otherwise used for commercial purposes.

## 2020-06-23 NOTE — PROGRESS NOTES
responded to Code S initiated for patient.  provided a spiritual presence, assurance of prayer, and emotional support.       Bradley Habermann

## 2020-06-23 NOTE — PROGRESS NOTES
Patient stated he had to use the bathroom. BP laying was checked before he got up and it was 111/65 HR 88. Sitting BP 98/62 HR 63. When patient stood up he said \"I can't stand' and layed back down and started reporting nausea and chest pain. JOSE RAMON Rene notified. Orders to get an EKG. During EKG patient stated he is having left sided facial numbness. Patient refused to smile, raise arm, raise eyebrows and stated he can't follow any commands. Erasmo Hoffman NP came to bedside to assess patient and Code S called. See flowsheet for code S details.

## 2020-06-23 NOTE — ROUTINE PROCESS
Bedside and Verbal shift change report given to self (oncoming nurse) by Shannon Vargas (offgoing nurse). Report included the following information SBAR, Kardex, MAR and Recent Results.

## 2020-06-23 NOTE — ROUTINE PROCESS
Discharge instructions reviewed with Venessa Garibay. Prescriptions given for lipitor, coreg, lisinopril, nitro and brilinta and med info sheets provided for all new medications. Opportunity for questions provided. Patient voiced understanding of all discharge instructions. IV and heart monitor removed

## 2020-06-23 NOTE — PROGRESS NOTES
[facility] CRITICAL CARE OUTREACH NURSE PROGRESS REPORT      SUBJECTIVE: Called to assess patient secondary to transfer from critical care. MEWS Score: 2 (06/23/20 0048)  Vitals:    06/22/20 1212 06/22/20 1740 06/22/20 2053 06/23/20 0048   BP: 104/67 113/66 99/61 95/64   Pulse: 68 74 82 82   Resp: 18 18 18 18   Temp: 97.9 °F (36.6 °C) 99.4 °F (37.4 °C) 99.1 °F (37.3 °C) 98.8 °F (37.1 °C)   SpO2: 97% 97% 93% 96%   Weight:       Height:         LAB DATA:    Recent Labs     06/22/20 0259 06/21/20 2326 06/21/20  1813    141 138   K 3.2* 4.8 4.5   * 109* 106   CO2 26 28 26   AGAP 7 4* 6*   GLU 76 179* 406*   BUN 15 13 16   CREA 0.69* 0.89 1.14   GFRAA >60 >60 >60   GFRNA >60 >60 >60   CA 7.8* 8.2* 8.3   ALB  --   --  3.3*   TP  --   --  7.0   GLOB  --   --  3.7*   AGRAT  --   --  0.9*   ALT  --   --  32        Recent Labs     06/22/20 0259 06/21/20 2326 06/21/20  1813   WBC 12.0* 12.3* 11.5*   HGB 12.9* 13.2* 13.9   HCT 38.4* 41.1 42.8    265 239          OBJECTIVE: On arrival to room, I found patient to be resting quietly in bed. ASSESSMENT:  Pt resting in bed, respirations even and unlabored. No apparent distress noted. NSR on bedside monitor, rate 62. VS, labs, and progress notes reviewed. PLAN:  Will continue to follow per outreach protocol.

## 2020-06-23 NOTE — PROGRESS NOTES
Pt. Refuses to get up for daily weight and refuses blood draw for morning labs. Kerry Gonzalez NP notified.

## 2020-06-23 NOTE — PROGRESS NOTES
am  6/23/2020 6:44 AM    Admit Date: 6/21/2020    Admit Diagnosis: STEMI (ST elevation myocardial infarction) ([facility] [address] [I21.3]      Subjective:    Patient sp AMI.  With acute HFrEF (heart failure reduced EF) seems stsble this am but BP is not going to allow uptitration of meds    Objective:      Visit Vitals  BP 91/62   Pulse 78   Temp 98.4 °F (36.9 °C)   Resp 18   Ht 5' 9\" (1.753 m)   Wt 171 lb (77.6 kg)   SpO2 94%   BMI 25.25 kg/m²       ROS:  General ROS: negative for - chills  Hematological and Lymphatic ROS: negative for - blood clots or jaundice  Respiratory ROS: no cough, shortness of breath, or wheezing  Cardiovascular ROS: no chest pain or dyspnea on exertion  Gastrointestinal ROS: no abdominal pain, change in bowel habits, or black or bloody stools  Neurological ROS: no TIA or stroke symptoms    Physical Exam:    Physical Examination: General appearance - alert, well appearing, and in no distress  Mental status - alert, oriented to person, place, and time  Eyes - pupils equal and reactive, extraocular eye movements intact  Neck/lymph - supple, no significant adenopathy  Chest/CV - clear to auscultation, no wheezes, rales or rhonchi, symmetric air entry  Heart - normal rate, regular rhythm, normal S1, S2, no murmurs, rubs, clicks or gallops  Abdomen/GI - soft, nontender, nondistended, no masses or organomegaly  Musculoskeletal - no joint tenderness, deformity or swelling  Extremities - peripheral pulses normal, no pedal edema, no clubbing or cyanosis  Skin - normal coloration and turgor, no rashes, no suspicious skin lesions noted    Current Facility-Administered Medications   Medication Dose Route Frequency    NUTRITIONAL SUPPORT ELECTROLYTE PRN ORDERS   Does Not Apply PRN    carvediloL (COREG) tablet 3.125 mg  3.125 mg Oral BID WITH MEALS    HYDROcodone-acetaminophen (NORCO) 5-325 mg per tablet 1 Tab  1 Tab Oral Q4H PRN    morphine 10 mg/ml injection 4 mg  4 mg IntraVENous Q4H PRN    nitroglycerin (NITROSTAT) tablet 0.4 mg  0.4 mg SubLINGual Q5MIN PRN    aspirin chewable tablet 81 mg  81 mg Oral DAILY    atorvastatin (LIPITOR) tablet 80 mg  80 mg Oral QHS    ondansetron (ZOFRAN) injection 4 mg  4 mg IntraVENous Q4H PRN    docusate sodium (COLACE) capsule 100 mg  100 mg Oral BID PRN    insulin lispro (HUMALOG) injection   SubCUTAneous AC&HS    pantoprazole (PROTONIX) tablet 40 mg  40 mg Oral ACB    alum-mag hydroxide-simeth (MYLANTA) oral suspension 30 mL  30 mL Oral Q4H PRN    diphenhydrAMINE (BENADRYL) capsule 25 mg  25 mg Oral Q6H PRN    ticagrelor (BRILINTA) tablet 90 mg  90 mg Oral Q12H    0.9% sodium chloride infusion  75 mL/hr IntraVENous CONTINUOUS    lisinopriL (PRINIVIL, ZESTRIL) tablet 5 mg  5 mg Oral DAILY    sodium chloride (NS) flush 5-40 mL  5-40 mL IntraVENous Q8H    sodium chloride (NS) flush 5-40 mL  5-40 mL IntraVENous PRN    acetaminophen (TYLENOL) tablet 650 mg  650 mg Oral Q4H PRN    morphine injection 2 mg  2 mg IntraVENous Q4H PRN    LORazepam (ATIVAN) tablet 1 mg  1 mg Oral Q6H PRN    nicotine (NICODERM CQ) 14 mg/24 hr patch 1 Patch  1 Patch TransDERmal Q24H       Data Review:   @LABRCNT(Na,K,BUN,CREA,WBC,HGB,HCT,PLT,INR,TRP,TCHOL*,Triglyceride*,LDL*,LDLCPOC HDL*,HDL])@    TELEMETRY: nsr    Assessment/Plan:     Active Problems:    Dyslipidemia associated with type 2 diabetes mellitus ([facility] (10/25/2015)      HTN (hypertension) (10/25/2015)      STEMI (ST elevation myocardial infarction) ([facility] (6/21/2020)      Acute HFrEF (heart failure with reduced ejection fraction) ([facility] (6/23/2020)      Plan   1. Needs to continue DAPT  2. Remain on current doses of BB and ACEi after MI  3. Continue current doses of HFrEF (heart failure reduced EF) meds  4. Ef is down but does not meet lifevest criteria  5. Continue lipid therapy  6. tsh is a little low. Check free t4 and vit d  7.  We can try discharge home today    Mary Posada MD

## 2020-06-23 NOTE — CONSULTS
Consult    Patient: Casey Zamudio MRN: [id number]     YOB: 1970  Age: 44 y.o. Sex: male      Subjective:      Casey Zamudio is a 44 y.o. male who is being seen for code S. The patient is currently admitted for chest pain with PCI and multiple stents with heart cath yesterday. Received full anticoagulation for cath. Now on aspirin and Brillinta. Plan for d/c today. At 11:20, the patient complained of chest pain, right facial numbness, and left sided weakness. The patient was last known normal at 1120. A code S was called by nursing at 1130. Neurology arrived to the bedside at 1133. Initial NIHSS was 2. A CT of the head was obtained and was negative for acute changes. CTA was obtained, which was negative for LVO. CTP was negative for infarct. Past Medical History:   Diagnosis Date    Diabetes ([facility] [address]     Hypertension     Other ill-defined conditions(799.89)     high cholesterol     Past Surgical History:   Procedure Laterality Date    HX APPENDECTOMY      HX OTHER SURGICAL      hemorrhoidectomy      History reviewed. No pertinent family history.   Social History     Tobacco Use    Smoking status: Current Every Day Smoker     Packs/day: 1.00   Substance Use Topics    Alcohol use: Yes     Comment: socially      Current Facility-Administered Medications   Medication Dose Route Frequency Provider Last Rate Last Dose    NUTRITIONAL SUPPORT ELECTROLYTE PRN ORDERS   Does Not Apply PRN Susan Ponce NP        carvediloL (COREG) tablet 3.125 mg  3.125 mg Oral BID WITH MEALS Sean Mcintosh MD   3.125 mg at 06/23/20 0963    HYDROcodone-acetaminophen (NORCO) 5-325 mg per tablet 1 Tab  1 Tab Oral Q4H PRN Susan Ponce NP   1 Tab at 06/22/20 1731    morphine 10 mg/ml injection 4 mg  4 mg IntraVENous Q4H PRN Susan Ponce NP        nitroglycerin (NITROSTAT) tablet 0.4 mg  0.4 mg SubLINGual Q5MIN PRN Susan Ponce NP        aspirin chewable tablet 81 mg  81 mg Oral DAILY Tawnya Dy, NP   81 mg at 06/23/20 1173    atorvastatin (LIPITOR) tablet 80 mg  80 mg Oral QHS Tawnya Dy, NP   80 mg at 06/22/20 2314    ondansetron [facility]) injection 4 mg  4 mg IntraVENous Q4H PRN Tawnya Dy, NP   4 mg at 06/22/20 1202    docusate sodium (COLACE) capsule 100 mg  100 mg Oral BID PRN Tawnya Dy, NP        insulin lispro (HUMALOG) injection   SubCUTAneous AC&HS Tawnya Dy, NP   4 Units at 06/23/20 1229    pantoprazole (PROTONIX) tablet 40 mg  40 mg Oral ACB Tawnya Dy, NP   40 mg at 06/23/20 4097    alum-mag hydroxide-simeth (MYLANTA) oral suspension 30 mL  30 mL Oral Q4H PRN Tawnya Dy, NP        diphenhydrAMINE (BENADRYL) capsule 25 mg  25 mg Oral Q6H PRN Tawnya Dy, NP        ticagror [facility]) tablet 90 mg  90 mg Oral Q12H Charli Burgess K, NP   90 mg at 06/23/20 9785    0.9% sodium chloride infusion  75 mL/hr IntraVENous CONTINUOUS Tawnya Dy, NP 75 mL/hr at 06/23/20 0111 75 mL/hr at 06/23/20 0111    lisinopriL (PRINIVIL, ZESTRIL) tablet 5 mg  5 mg Oral DAILY Tawnya Dy, NP   5 mg at 06/23/20 3450    sodium chloride (NS) flush 5-40 mL  5-40 mL IntraVENous Q8H Heather Mayer MD   5 mL at 06/22/20 2318    sodium chloride (NS) flush 5-40 mL  5-40 mL IntraVENous PRN Heather Mayer MD        acetaminophen (TYLENOL) tablet 650 mg  650 mg Oral Q4H PRN Heather Mayer MD   650 mg at 06/23/20 1228    morphine injection 2 mg  2 mg IntraVENous Q4H PRN Bijal Kwon MD        LORazepam (ATIVAN) tablet 1 mg  1 mg Oral Q6H PRN Heather Mayer MD        nicotine (NICODERM CQ) 14 mg/24 hr patch 1 Patch  1 Patch TransDERmal Q24H Tawnya Dy, NP   1 Patch at 06/22/20 2200        Allergies   Allergen Reactions    Bactrim [Sulfamethoprim] Other (comments)     Burns me inside out    Sulfa (Sulfonamide Antibiotics) Rash       Review of Systems:  Not obtained due to emergent situation         Objective: Vitals:    06/23/20 0527 06/23/20 0809 06/23/20 1109 06/23/20 1134   BP: 91/62 111/66 111/65 105/66   Pulse: 78 85 88 85   Resp: 18 20     Temp: 98.4 °F (36.9 °C) 97.6 °F (36.4 °C)     SpO2: 94% 96%  97%   Weight:       Height:            Physical Exam:  General - Well developed, well nourished, in no apparent distress. Pleasant and conversent. HEENT - Normocephalic, atraumatic. Conjunctiva, tympanic membranes, and oropharynx are clear. Neck - Supple without masses, no bruits    Extremities - Peripheral pulses intact. No edema and no rashes. NIHSS   NIHSS Score: 2  1a-Level of Consciousness 0  1b-What is Month/Age 0  1c-Open/Close Eyes&Hand 0  2 -Best Gaze 0  3 -Visual Fields 0  4 -Facial Palsy 0  5a-Motor-Left Arm 0   5b-Motor-Right Arm 0  6a-Motor-Left Leg 1  6b-Motor-Right Leg 0  7 -Limb Ataxia 0  8 -Sensory 1  9 -Best Language 0  10-Dysarthria 0  11-Extinction/Inattention 0    Lab Results   Component Value Date/Time    Cholesterol, total 162 06/21/2020 11:26 PM    HDL Cholesterol 39 (L) 06/21/2020 11:26 PM    LDL, calculated 94 06/21/2020 11:26 PM    VLDL, calculated 29 (H) 06/21/2020 11:26 PM    Triglyceride 145 06/21/2020 11:26 PM    CHOL/HDL Ratio 4.2 06/21/2020 11:26 PM        Lab Results   Component Value Date/Time    Hemoglobin A1c 12.2 (H) 06/21/2020 11:26 PM        CT Results (most recent):  Results from Hospital Encounter encounter on 06/21/20   CT PERF W CBF    Narrative EXAMINATION: CT PERFUSION    DATE: 6/23/2020 11:58 AM    ACCESSION NUMBER:  [id number]    INDICATION: : code s, HA, left sided numbness/weakness    COMPARISON: Same-day head CT and CTA head and neck    TECHNIQUE: CT perfusion of the brain was obtained after the administration of  intravenous contrast. Perfusion maps and perfusion analysis output were  generated using the VIZ perfusion processing software algorithm.     Radiation dose reduction techniques were used for this study:  Our CT scanners  use one or all of the following: Automated exposure control, adjustment of the  mA and/or kVp according to patient's size, iterative reconstruction. FINDINGS:     There are no areas of cerebral blood flow less than 30 percent or Tmax greater  than 6 seconds. Minimal Tmax greater than 4 seconds in the right temporal lobe  is likely artifactual or due to chronic oligemia. Scattered areas of cerebral blood flow less than 38 percent may be artifactual  as well. Impression IMPRESSION:    No CT perfusion evidence of large territory core infarction or reversible  ischemia. VOICE DICTATED BY: Dr. Rubio Crew       Results for orders placed or performed during the hospital encounter of 06/21/20   EKG, 12 LEAD, INITIAL   Result Value Ref Range    Ventricular Rate 79 BPM    Atrial Rate 79 BPM    P-R Interval 164 ms    QRS Duration 100 ms    Q-T Interval 406 ms    QTC Calculation (Bezet) 465 ms    Calculated P Axis 47 degrees    Calculated R Axis -70 degrees    Calculated T Axis 70 degrees    Diagnosis       Normal sinus rhythm  Left anterior fascicular block  Abnormal ECG  When compared with ECG of 22-JUN-2020 07:09,  No significant change was found  Confirmed by GA EASTMAN (), Lanny Pedraza ([id number]) on 6/23/2020 12:06:46 PM          MRI Results (most recent):   No results found for this or any previous visit. Most recent CTA:  Results from [place] encounter on 06/21/20   CT PERF W CBF    Narrative EXAMINATION: CT PERFUSION    DATE: 6/23/2020 11:58 AM    ACCESSION NUMBER:  [id number]    INDICATION: : code s, HA, left sided numbness/weakness    COMPARISON: Same-day head CT and CTA head and neck    TECHNIQUE: CT perfusion of the brain was obtained after the administration of  intravenous contrast. Perfusion maps and perfusion analysis output were  generated using the VIZ perfusion processing software algorithm.     Radiation dose reduction techniques were used for this study:  Our CT scanners  use one or all of the following: Automated exposure control, adjustment of the  mA and/or kVp according to patient's size, iterative reconstruction. FINDINGS:     There are no areas of cerebral blood flow less than 30 percent or Tmax greater  than 6 seconds. Minimal Tmax greater than 4 seconds in the right temporal lobe  is likely artifactual or due to chronic oligemia. Scattered areas of cerebral blood flow less than 38 percent may be artifactual  as well. Impression IMPRESSION:    No CT perfusion evidence of large territory core infarction or reversible  ischemia. VOICE DICTATED BY: Dr. Caleb Ramirez     Most recent Echo:  Results for orders placed or performed during the hospital encounter of 20   2D ECHO COMPLETE ADULT (TTE) [address]  [address] [facility], [address] [facility]  [phone]    Transthoracic Echocardiogram  2D, M-mode, Doppler, and Color Doppler    Patient: Tamara Morgan  MR #: [id number]  : 1970  Age: 44 years  Gender: Male  Study date: 2020  Account #: [de-identified]  Height: 69 in  Weight: 170.7 lb  BSA: 1.93 mï¾²  Status:Routine  Location: [facility]  BP: 102/ 64    Allergies: SULFAMETHOPRIM, SULFA (SULFONAMIDE ANTIBIOTICS)    Sonographer:  CONSTANCE Jeffries  Group:  [facility]  Referring Physician:  Danis Gonzalez. Gutierrez Medina NP  Reading Physician:  Laura Kim MD    INDICATIONS: STEMI    PROCEDURE: This was a routine study. A transthoracic echocardiogram was  performed. The study included complete 2D imaging, M-mode, complete spectral  Doppler, and color Doppler. Intravenous contrast (Definity) was administered. Intravenous contrast (agitated saline) was administered. Image quality was  adequate. LEFT VENTRICLE: Size was normal. Systolic function was mildly reduced. Ejection  fraction was estimated in the range of 40 % to 45 %. There was moderate  inferior/inferolateral wall hypokinesis.  Wall thickness was normal. Doppler  parameters were consistent with grade 1 diastolic dysfunction. Avg E/e': 7.5. RIGHT VENTRICLE: The size was normal. Systolic function was normal. The  tricuspid jet envelope definition was inadequate for estimation of RV   systolic  pressure. LEFT ATRIUM: Size was normal.    ATRIAL SEPTUM: Agitated saline contrast injection (bubble study) was   performed. There was no right-to-left shunt, at rest or induced by the Valsalva   maneuver. RIGHT ATRIUM: Size was normal.    SYSTEMIC VEINS: IVC: The inferior vena cava was normal in size and course. AORTIC VALVE: The valve was probably trileaflet. There was no evidence for  stenosis. There was no insufficiency. MITRAL VALVE: Valve structure was normal. There was no evidence for stenosis. There was trace regurgitation. TRICUSPID VALVE: The valve structure was normal. There was no evidence for  stenosis. There was trivial regurgitation. PULMONIC VALVE: The valve structure was normal. There was no evidence for  stenosis. There was no insufficiency. PERICARDIUM: There was no pericardial effusion. AORTA: The root exhibited normal size. SUMMARY:    -  Left ventricle: Systolic function was mildly reduced. Ejection fraction   was  estimated in the range of 40 % to 45 %. There was moderate  inferior/inferolateral wall hypokinesis. SYSTEM MEASUREMENT TABLES    2D mode  AoR Diam (2D): 3.5 cm  LA Dimension (2D): 2.9 cm  Left Atrium Systolic Volume Index; Method of Disks, Biplane; 2D mode;: 19.7  ml/m2  IVS/LVPW (2D): 1.2  IVSd (2D): 1.1 cm  LVIDd (2D): 5.1 cm  LVIDs (2D): 3.8 cm  LVOT Area (2D): 3.5 cm2  LVPWd (2D): 1 cm  RVIDd (2D): 2.4 cm    Unspecified Scan Mode  Peak Grad; Mean; Antegrade Flow: 8 mm[Hg]  Vmax; Antegrade Flow: 139 cm/s  LVOT Diam: 2.1 cm    Prepared and signed by    Nikki Berry MD  Signed 22-Jun-2020 14:38:08             Assessment:     44year old male who is currently admitted for chest pain s/p PCI with stents.  The patient developed right facial numbness and left sided weakness and a code S was called. Initial NIHSS was 2. CT of the head was obtained and was negative for acute changes . CTA of head neck was obtained and was negative for LVO. Overall suspicion for stroke is low. This can be excluded with MRI if the patient can have one with new stents. The patient was not a candidate for alteplase because of recent anticoagulation and low NIHSS. The patient was not a candidate for mechanical thrombectomy, as no large vessel occlusion was identified on CTA. Plan:     · Continue aspirin and Brilinta  · Continue statin  · MRI of brain if able    Signed By: Briana Arora NP     June 23, 2020      I spent 50 minutes with the patient. Over 50% of that time was spent face-to-face with the patient and family. Neuro attending    I was in attendance at the entire Code S as documented above and spent a total of 70 minutes in attendance with additional medical record review review of imaging on the PACS system of both CT and CTA    History is as noted above    Degree of weakness of the left side is not specifically convincing however given the fact the patient has severe underlying coronary artery disease importance of excluding concomitant carotid disease and cardiac rhythm issues is self evidently highly important.     In terms of current treatment he is on dual antiplatelet therapy and this is appropriate from a neurological standpoint

## 2020-06-24 ENCOUNTER — PATIENT OUTREACH (OUTPATIENT)
Dept: CASE MANAGEMENT | Age: 50
End: 2020-06-24

## 2020-06-24 NOTE — PROGRESS NOTES
1st attempt to contact patient for COVID 19 risk Education. Unable to reach patient on contact information provided. Unable to leave a voice message. This Care Coordinator will attempt again within 1 business day .

## 2020-06-25 ENCOUNTER — PATIENT OUTREACH (OUTPATIENT)
Dept: CASE MANAGEMENT | Age: 50
End: 2020-06-25

## 2020-07-01 ENCOUNTER — HOSPITAL ENCOUNTER (OUTPATIENT)
Dept: LAB | Age: 50
Discharge: HOME | End: 2020-07-01
Attending: INTERNAL MEDICINE
Payer: MEDICAID

## 2020-07-01 DIAGNOSIS — I21.11 ST ELEVATION MYOCARDIAL INFARCTION INVOLVING RIGHT CORONARY ARTERY (HCC): ICD-10-CM

## 2020-07-01 LAB
ANION GAP SERPL CALC-SCNC: 9 MMOL/L (ref 7–16)
BASOPHILS # BLD: 0.1 K/UL (ref 0–0.2)
BASOPHILS NFR BLD: 1 % (ref 0–2)
BUN SERPL-MCNC: 17 MG/DL (ref 6–23)
CALCIUM SERPL-MCNC: 9.5 MG/DL (ref 8.3–10.4)
CHLORIDE SERPL-SCNC: 101 MMOL/L (ref 98–107)
CO2 SERPL-SCNC: 22 MMOL/L (ref 21–32)
CREAT SERPL-MCNC: 1.13 MG/DL (ref 0.8–1.5)
DIFFERENTIAL METHOD BLD: NORMAL
EOSINOPHIL # BLD: 0.1 K/UL (ref 0–0.8)
EOSINOPHIL NFR BLD: 1 % (ref 0.5–7.8)
ERYTHROCYTE [DISTWIDTH] IN BLOOD BY AUTOMATED COUNT: 13.4 % (ref 11.9–14.6)
GLUCOSE SERPL-MCNC: 576 MG/DL (ref 65–100)
HCT VFR BLD AUTO: 45.8 % (ref 41.1–50.3)
HGB BLD-MCNC: 14.8 G/DL (ref 13.6–17.2)
IMM GRANULOCYTES # BLD AUTO: 0 K/UL (ref 0–0.5)
IMM GRANULOCYTES NFR BLD AUTO: 0 % (ref 0–5)
LYMPHOCYTES # BLD: 3.5 K/UL (ref 0.5–4.6)
LYMPHOCYTES NFR BLD: 40 % (ref 13–44)
MCH RBC QN AUTO: 27 PG (ref 26.1–32.9)
MCHC RBC AUTO-ENTMCNC: 32.3 G/DL (ref 31.4–35)
MCV RBC AUTO: 83.6 FL (ref 79.6–97.8)
MONOCYTES # BLD: 0.5 K/UL (ref 0.1–1.3)
MONOCYTES NFR BLD: 6 % (ref 4–12)
NEUTS SEG # BLD: 4.6 K/UL (ref 1.7–8.2)
NEUTS SEG NFR BLD: 53 % (ref 43–78)
NRBC # BLD: 0 K/UL (ref 0–0.2)
PLATELET # BLD AUTO: 319 K/UL (ref 150–450)
PMV BLD AUTO: 10.2 FL (ref 9.4–12.3)
POTASSIUM SERPL-SCNC: 4.5 MMOL/L (ref 3.5–5.1)
RBC # BLD AUTO: 5.48 M/UL (ref 4.23–5.6)
SODIUM SERPL-SCNC: 132 MMOL/L (ref 136–145)
WBC # BLD AUTO: 8.8 K/UL (ref 4.3–11.1)

## 2020-07-01 PROCEDURE — 36415 COLL VENOUS BLD VENIPUNCTURE: CPT

## 2020-07-01 PROCEDURE — 85025 COMPLETE CBC W/AUTO DIFF WBC: CPT

## 2020-07-01 PROCEDURE — 80048 BASIC METABOLIC PNL TOTAL CA: CPT

## 2020-07-09 NOTE — PROGRESS NOTES
Patient pre-assessment complete for Fabi Mendez scheduled for [address] [facility], arrival time 0730. Patient verified using . Patient instructed to bring all home medications in labeled bottles on the day of procedure. NPO status reinforced. Patient informed to take a full dose aspirin 325mg  or 81 mg x 4 on the day of procedure. Patient instructed to take plavix and coreg,  Patient instructed to HOLD insulin morning of procedure. Instructed they can take all other medications excluding vitamins & supplements. Patient verbalizes understanding of all instructions & denies any questions at this time.

## 2020-07-10 ENCOUNTER — HOSPITAL ENCOUNTER (OUTPATIENT)
Dept: CARDIAC CATH/INVASIVE PROCEDURES | Age: 50
Discharge: HOME | End: 2020-07-10
Attending: INTERNAL MEDICINE | Admitting: INTERNAL MEDICINE

## 2020-07-10 VITALS
RESPIRATION RATE: 18 BRPM | WEIGHT: 177 LBS | SYSTOLIC BLOOD PRESSURE: 110 MMHG | HEART RATE: 79 BPM | OXYGEN SATURATION: 98 % | HEIGHT: 69 IN | DIASTOLIC BLOOD PRESSURE: 82 MMHG | BODY MASS INDEX: 26.22 KG/M2

## 2020-07-10 DIAGNOSIS — E11.65 TYPE 2 DIABETES MELLITUS WITH HYPERGLYCEMIA, WITH LONG-TERM CURRENT USE OF INSULIN (HCC): ICD-10-CM

## 2020-07-10 DIAGNOSIS — Z79.4 TYPE 2 DIABETES MELLITUS WITH HYPERGLYCEMIA, WITH LONG-TERM CURRENT USE OF INSULIN (HCC): ICD-10-CM

## 2020-07-10 PROBLEM — E11.49 TYPE 2 DIABETES MELLITUS WITH NEUROLOGIC COMPLICATION, WITH LONG-TERM CURRENT USE OF INSULIN (HCC): Status: ACTIVE | Noted: 2020-07-10

## 2020-07-10 LAB
ANION GAP SERPL CALC-SCNC: 8 MMOL/L (ref 7–16)
ATRIAL RATE: 77 BPM
ATRIAL RATE: 78 BPM
ATRIAL RATE: 82 BPM
BUN SERPL-MCNC: 15 MG/DL (ref 6–23)
CALCIUM SERPL-MCNC: 8.2 MG/DL (ref 8.3–10.4)
CALCULATED P AXIS, ECG09: 42 DEGREES
CALCULATED P AXIS, ECG09: 44 DEGREES
CALCULATED P AXIS, ECG09: 45 DEGREES
CALCULATED R AXIS, ECG10: -56 DEGREES
CALCULATED R AXIS, ECG10: -69 DEGREES
CALCULATED R AXIS, ECG10: -70 DEGREES
CALCULATED T AXIS, ECG11: 39 DEGREES
CALCULATED T AXIS, ECG11: 39 DEGREES
CALCULATED T AXIS, ECG11: 57 DEGREES
CHLORIDE SERPL-SCNC: 105 MMOL/L (ref 98–107)
CO2 SERPL-SCNC: 24 MMOL/L (ref 21–32)
CREAT SERPL-MCNC: 0.83 MG/DL (ref 0.8–1.5)
DIAGNOSIS, 93000: NORMAL
GLUCOSE BLD STRIP.AUTO-MCNC: 334 MG/DL (ref 65–100)
GLUCOSE BLD STRIP.AUTO-MCNC: 452 MG/DL (ref 65–100)
GLUCOSE BLD STRIP.AUTO-MCNC: 545 MG/DL (ref 65–100)
GLUCOSE BLD STRIP.AUTO-MCNC: 591 MG/DL (ref 65–100)
GLUCOSE SERPL-MCNC: 495 MG/DL (ref 65–100)
P-R INTERVAL, ECG05: 166 MS
P-R INTERVAL, ECG05: 174 MS
P-R INTERVAL, ECG05: 174 MS
POTASSIUM SERPL-SCNC: 4.2 MMOL/L (ref 3.5–5.1)
Q-T INTERVAL, ECG07: 394 MS
Q-T INTERVAL, ECG07: 396 MS
Q-T INTERVAL, ECG07: 404 MS
QRS DURATION, ECG06: 100 MS
QRS DURATION, ECG06: 102 MS
QRS DURATION, ECG06: 112 MS
QTC CALCULATION (BEZET), ECG08: 448 MS
QTC CALCULATION (BEZET), ECG08: 460 MS
QTC CALCULATION (BEZET), ECG08: 460 MS
SODIUM SERPL-SCNC: 137 MMOL/L (ref 136–145)
VENTRICULAR RATE, ECG03: 77 BPM
VENTRICULAR RATE, ECG03: 78 BPM
VENTRICULAR RATE, ECG03: 82 BPM

## 2020-07-10 PROCEDURE — 99152 MOD SED SAME PHYS/QHP 5/>YRS: CPT

## 2020-07-10 PROCEDURE — 77030016699 HC CATH ANGI DX INFN1 CARD -A

## 2020-07-10 PROCEDURE — 93005 ELECTROCARDIOGRAM TRACING: CPT | Performed by: INTERNAL MEDICINE

## 2020-07-10 PROCEDURE — 74011636637 HC RX REV CODE- 636/637: Performed by: NURSE PRACTITIONER

## 2020-07-10 PROCEDURE — 82962 GLUCOSE BLOOD TEST: CPT

## 2020-07-10 PROCEDURE — C1725 CATH, TRANSLUMIN NON-LASER: HCPCS

## 2020-07-10 PROCEDURE — 77030012468 HC VLV BLEEDBK CNTRL ABBT -B

## 2020-07-10 PROCEDURE — C1887 CATHETER, GUIDING: HCPCS

## 2020-07-10 PROCEDURE — 92928 PRQ TCAT PLMT NTRAC ST 1 LES: CPT

## 2020-07-10 PROCEDURE — 74011250636 HC RX REV CODE- 250/636: Performed by: INTERNAL MEDICINE

## 2020-07-10 PROCEDURE — 74011636320 HC RX REV CODE- 636/320: Performed by: INTERNAL MEDICINE

## 2020-07-10 PROCEDURE — 77030029997 HC DEV COM RDL R BND TELE -B

## 2020-07-10 PROCEDURE — 74011250637 HC RX REV CODE- 250/637: Performed by: INTERNAL MEDICINE

## 2020-07-10 PROCEDURE — 74011636637 HC RX REV CODE- 636/637: Performed by: INTERNAL MEDICINE

## 2020-07-10 PROCEDURE — 99153 MOD SED SAME PHYS/QHP EA: CPT

## 2020-07-10 PROCEDURE — C1874 STENT, COATED/COV W/DEL SYS: HCPCS

## 2020-07-10 PROCEDURE — 85347 COAGULATION TIME ACTIVATED: CPT

## 2020-07-10 PROCEDURE — 93458 L HRT ARTERY/VENTRICLE ANGIO: CPT

## 2020-07-10 PROCEDURE — C1769 GUIDE WIRE: HCPCS

## 2020-07-10 PROCEDURE — 74011000258 HC RX REV CODE- 258: Performed by: INTERNAL MEDICINE

## 2020-07-10 PROCEDURE — 80048 BASIC METABOLIC PNL TOTAL CA: CPT

## 2020-07-10 PROCEDURE — C1894 INTRO/SHEATH, NON-LASER: HCPCS

## 2020-07-10 PROCEDURE — 74011000250 HC RX REV CODE- 250: Performed by: INTERNAL MEDICINE

## 2020-07-10 RX ORDER — SODIUM CHLORIDE 9 MG/ML
75 INJECTION, SOLUTION INTRAVENOUS CONTINUOUS
Status: DISCONTINUED | OUTPATIENT
Start: 2020-07-10 | End: 2020-07-10 | Stop reason: HOSPADM

## 2020-07-10 RX ORDER — CLOPIDOGREL BISULFATE 75 MG/1
600 TABLET ORAL ONCE
Status: COMPLETED | OUTPATIENT
Start: 2020-07-10 | End: 2020-07-10

## 2020-07-10 RX ORDER — LORAZEPAM 1 MG/1
1 TABLET ORAL
Status: DISCONTINUED | OUTPATIENT
Start: 2020-07-10 | End: 2020-07-10 | Stop reason: HOSPADM

## 2020-07-10 RX ORDER — LIDOCAINE HYDROCHLORIDE 10 MG/ML
1-10 INJECTION, SOLUTION EPIDURAL; INFILTRATION; INTRACAUDAL; PERINEURAL ONCE
Status: COMPLETED | OUTPATIENT
Start: 2020-07-10 | End: 2020-07-10

## 2020-07-10 RX ORDER — HEPARIN SODIUM 200 [USP'U]/100ML
2 INJECTION, SOLUTION INTRAVENOUS CONTINUOUS
Status: DISCONTINUED | OUTPATIENT
Start: 2020-07-10 | End: 2020-07-10 | Stop reason: HOSPADM

## 2020-07-10 RX ORDER — MIDAZOLAM HYDROCHLORIDE 1 MG/ML
.5-2 INJECTION, SOLUTION INTRAMUSCULAR; INTRAVENOUS
Status: DISCONTINUED | OUTPATIENT
Start: 2020-07-10 | End: 2020-07-10 | Stop reason: HOSPADM

## 2020-07-10 RX ORDER — LANCETS
EACH MISCELLANEOUS
Qty: 1 EACH | Refills: 11 | OUTPATIENT
Start: 2020-07-10

## 2020-07-10 RX ORDER — GUAIFENESIN 100 MG/5ML
324 LIQUID (ML) ORAL ONCE
Status: DISCONTINUED | OUTPATIENT
Start: 2020-07-10 | End: 2020-07-10 | Stop reason: HOSPADM

## 2020-07-10 RX ORDER — ACETAMINOPHEN 325 MG/1
650 TABLET ORAL
Status: DISCONTINUED | OUTPATIENT
Start: 2020-07-10 | End: 2020-07-10 | Stop reason: HOSPADM

## 2020-07-10 RX ORDER — NITROGLYCERIN 0.4 MG/1
0.4 TABLET SUBLINGUAL
Status: DISCONTINUED | OUTPATIENT
Start: 2020-07-10 | End: 2020-07-10 | Stop reason: HOSPADM

## 2020-07-10 RX ORDER — GUAIFENESIN 100 MG/5ML
81 LIQUID (ML) ORAL DAILY
Status: DISCONTINUED | OUTPATIENT
Start: 2020-07-11 | End: 2020-07-10 | Stop reason: HOSPADM

## 2020-07-10 RX ORDER — GABAPENTIN 300 MG/1
300 CAPSULE ORAL 3 TIMES DAILY
Qty: 90 CAP | Refills: 0 | OUTPATIENT
Start: 2020-07-10 | End: 2020-08-09

## 2020-07-10 RX ORDER — INSULIN LISPRO 100 [IU]/ML
INJECTION, SOLUTION INTRAVENOUS; SUBCUTANEOUS
Status: DISCONTINUED | OUTPATIENT
Start: 2020-07-10 | End: 2020-07-10 | Stop reason: HOSPADM

## 2020-07-10 RX ORDER — MAG HYDROX/ALUMINUM HYD/SIMETH 200-200-20
30 SUSPENSION, ORAL (FINAL DOSE FORM) ORAL AS NEEDED
Status: DISCONTINUED | OUTPATIENT
Start: 2020-07-10 | End: 2020-07-10 | Stop reason: HOSPADM

## 2020-07-10 RX ORDER — SODIUM CHLORIDE 0.9 % (FLUSH) 0.9 %
5-40 SYRINGE (ML) INJECTION EVERY 8 HOURS
Status: DISCONTINUED | OUTPATIENT
Start: 2020-07-10 | End: 2020-07-10 | Stop reason: HOSPADM

## 2020-07-10 RX ORDER — FENTANYL CITRATE 50 UG/ML
25-50 INJECTION, SOLUTION INTRAMUSCULAR; INTRAVENOUS
Status: DISCONTINUED | OUTPATIENT
Start: 2020-07-10 | End: 2020-07-10 | Stop reason: HOSPADM

## 2020-07-10 RX ORDER — SODIUM CHLORIDE 0.9 % (FLUSH) 0.9 %
5-40 SYRINGE (ML) INJECTION AS NEEDED
Status: DISCONTINUED | OUTPATIENT
Start: 2020-07-10 | End: 2020-07-10 | Stop reason: HOSPADM

## 2020-07-10 RX ORDER — MORPHINE SULFATE 2 MG/ML
2 INJECTION, SOLUTION INTRAMUSCULAR; INTRAVENOUS
Status: DISCONTINUED | OUTPATIENT
Start: 2020-07-10 | End: 2020-07-10 | Stop reason: HOSPADM

## 2020-07-10 RX ORDER — INSULIN ASPART 100 [IU]/ML
21 INJECTION, SUSPENSION SUBCUTANEOUS
Qty: 10 ML | Refills: 2 | Status: SHIPPED | OUTPATIENT
Start: 2020-07-10 | End: 2020-08-19 | Stop reason: SDUPTHER

## 2020-07-10 RX ORDER — SODIUM CHLORIDE 0.9 % (FLUSH) 0.9 %
5 SYRINGE (ML) INJECTION AS NEEDED
Status: DISCONTINUED | OUTPATIENT
Start: 2020-07-10 | End: 2020-07-10 | Stop reason: HOSPADM

## 2020-07-10 RX ADMIN — SODIUM CHLORIDE 75 ML/HR: 900 INJECTION, SOLUTION INTRAVENOUS at 08:12

## 2020-07-10 RX ADMIN — ALUMINUM HYDROXIDE, MAGNESIUM HYDROXIDE, AND SIMETHICONE 30 ML: 200; 200; 20 SUSPENSION ORAL at 10:12

## 2020-07-10 RX ADMIN — IOPAMIDOL 100 ML: 755 INJECTION, SOLUTION INTRAVENOUS at 10:16

## 2020-07-10 RX ADMIN — INSULIN HUMAN 10 UNITS: 100 INJECTION, SUSPENSION SUBCUTANEOUS at 13:40

## 2020-07-10 RX ADMIN — HEPARIN SODIUM 2 UNITS/HR: 200 INJECTION, SOLUTION INTRAVENOUS at 09:40

## 2020-07-10 RX ADMIN — LIDOCAINE HYDROCHLORIDE 3 ML: 10 INJECTION, SOLUTION EPIDURAL; INFILTRATION; INTRACAUDAL; PERINEURAL at 09:49

## 2020-07-10 RX ADMIN — MIDAZOLAM 1 MG: 1 INJECTION INTRAMUSCULAR; INTRAVENOUS at 09:52

## 2020-07-10 RX ADMIN — BIVALIRUDIN 1.75 MG/KG/HR: 250 INJECTION INTRACAVERNOUS at 09:53

## 2020-07-10 RX ADMIN — HEPARIN SODIUM 2 ML: 10000 INJECTION INTRAVENOUS; SUBCUTANEOUS at 09:49

## 2020-07-10 RX ADMIN — FENTANYL CITRATE 25 MCG: 50 INJECTION, SOLUTION INTRAMUSCULAR; INTRAVENOUS at 09:43

## 2020-07-10 RX ADMIN — FENTANYL CITRATE 25 MCG: 50 INJECTION, SOLUTION INTRAMUSCULAR; INTRAVENOUS at 09:54

## 2020-07-10 RX ADMIN — MIDAZOLAM 2 MG: 1 INJECTION INTRAMUSCULAR; INTRAVENOUS at 09:43

## 2020-07-10 RX ADMIN — CLOPIDOGREL BISULFATE 600 MG: 75 TABLET ORAL at 10:11

## 2020-07-10 RX ADMIN — INSULIN LISPRO 15 UNITS: 100 INJECTION, SOLUTION INTRAVENOUS; SUBCUTANEOUS at 10:41

## 2020-07-10 RX ADMIN — INSULIN LISPRO 15 UNITS: 100 INJECTION, SOLUTION INTRAVENOUS; SUBCUTANEOUS at 11:35

## 2020-07-10 NOTE — PROGRESS NOTES
Discharge Same Day as PCI Teaching    Discharge teaching completed. Patient instructed on right radial site care, including symptoms to report to MD, medication changes & Follow up Care to include:    1- Patient is being treated with 2 separate anti-platelet medications including aspirin 81mg & plavix 75mg. It is very important that these medications are filled today started tomorrow 7/11/20. Patient instructed on the importance of these medications & that these medications must not be stopped for any reason or without talking to the doctor first.  2-  Patient is also being discharged on a medication for cholesterol management (ie-statin) atorvastatin 80mg and instructed on the importance of continuing this medication as well. 3- Patient received a referral for [facility], contact information was faxed to Cardiac rehab & patient given telephone number to contact ([phone]) Cardiac Rehab if they have not heard from them within 10 days.  [phone]

## 2020-07-10 NOTE — PROGRESS NOTES
Patient's Blood sugar 545. Patient states that his blood sugar usually \"runs above 500 every day. \" Patient states that he only takes insulin once a day (see home medications). Patient states that he refuses to take oral diabetic medications. Dr. Jones Jain notified. No new orders received at this time.

## 2020-07-10 NOTE — PROGRESS NOTES
TRANSFER - IN REPORT:    Verbal report received from [facility]) on Bhanu Weber  being received from cath lab(unit) for routine progression of care      Report consisted of patients Situation, Background, Assessment and   Recommendations(SBAR). Information from the following report(s) Procedure Summary was reviewed with the receiving nurse. Opportunity for questions and clarification was provided. Assessment completed upon patients arrival to unit and care assumed.

## 2020-07-10 NOTE — PROGRESS NOTES
Patient took Aspirin 324mg today at 0615 prior to arrival. Patient took plavix 75mg at 0615 prior to arrival.

## 2020-07-10 NOTE — DISCHARGE SUMMARY
[facility] Discharge Summary     Patient ID:  Reji James  [id number]  60 y.o.  1970    Admit date: 7/10/2020    Discharge date:  07/10/20    Admitting Physician: Loli Hernández MD     Discharge Physician: Thao Easton NP/Dr. La Chambers    Admission Diagnoses: ST elevation [R94.31]    Discharge Diagnoses:    Diagnosis        CAD    Dyslipidemia associated with type 2 diabetes mellitus ([facility]    HTN (hypertension)    Hepatitis C    Velazquez-Cristi syndrome ([facility]    Abnormal LFTs (liver function tests)    Hyponatremia    Hyperglycemia without ketosis    Hyperglycemia    Pyoderma (skin infection)    Bullous erythema multiforme (HCC)    Type II or unspecified type diabetes mellitus without mention of complication, uncontrolled       Cardiology Procedures this admission:  Left heart catheterization with PCI  Consults: [facility] Course: Patient was seen at the office of [facility] by Dr. Aubrey Lopez for a staged PCI and was subsequently scheduled for a LH at [facility] on 07/10/20. Patient underwent cardiac catheterization by Dr. La Chambers. Patient was found to have a 95%  stenosis of the LCX that was stented with a 3.0 x 15 MADY with 0% residual stenosis. . Patient tolerated the procedure well and was taken to CPRU for recovery. The patient was up feeling well without any complaints of chest pain or shortness of breath. Patient's right radial cath site was clean, dry and intact without hematoma or bruit. Patient's labs were WNL. Patient was seen and examined by Dr. La Chambers and determined stable and ready for discharge. Patient was instructed on the importance of medication compliance including taking ASA and Plavix. After receiving drug eluting stents, the patient will remain on dual anti-platelet therapy for 1 year. For maximized medical therapy for CAD, patient will continue ACE, Beta Blocker and Statin as well.  The patient is uncontrolled diabetic with  on his arrival.  Diabetes management was consulted with recommended changes implemented. The patient will follow up with his PCP with further recommendations. The patient will follow up with [address] [place] [address] [facility] and has been referred to cardiac rehab. DISPOSITION: The patient is being discharged home in stable condition on a low saturated fat, low cholesterol and low salt diet. The patient is instructed to advance activities as tolerated to the limit of fatigue or shortness of breath. The patient is instructed to avoid all heavy lifting for 5 days. The patient is instructed to watch the cath site for bleeding/oozing; if seen, the patient is instructed to apply firm pressure with a clean cloth and call [address] [place] [address] [facility] at [phone]. The patient is instructed to watch for signs of infection which include: increasing area of redness, fever/hot to touch or purulent drainage at the catheterization site. The patient is instructed not to soak in a bathtub for 7-10 days, but is cleared to shower. The patient is instructed to call the office or return to the ER for immediate evaluation for any shortness of breath or chest pain not relieved by NTG. Discharge Exam:   Visit Vitals  /86   Pulse 80   Resp 18   Ht 5' 9\" (1.753 m)   Wt 80.3 kg (177 lb)   SpO2 94%   BMI 26.14 kg/m²     Patient has been seen by Dr. Daryl Carmonaors see his progress note for exam details.     Recent Results (from the past 24 hour(s))   GLUCOSE, POC    Collection Time: 07/10/20  8:16 AM   Result Value Ref Range    Glucose (POC) 591 (HH) 65 - 000 mg/dL   METABOLIC PANEL, BASIC    Collection Time: 07/10/20  8:21 AM   Result Value Ref Range    Sodium 137 136 - 145 mmol/L    Potassium 4.2 3.5 - 5.1 mmol/L    Chloride 105 98 - 107 mmol/L    CO2 24 21 - 32 mmol/L    Anion gap 8 7 - 16 mmol/L    Glucose 495 (HH) 65 - 100 mg/dL    BUN 15 6 - 23 MG/DL    Creatinine 0.83 0.8 - 1.5 MG/DL    GFR est AA >60 >60 ml/min/1.73m2    GFR est non-AA >60 >60 ml/min/1.73m2    Calcium 8.2 (L) 8.3 - 10.4 MG/DL   GLUCOSE, POC    Collection Time: 07/10/20  8:21 AM   Result Value Ref Range    Glucose (POC) 545 (HH) 65 - 100 mg/dL   EKG, 12 LEAD, INITIAL    Collection Time: 07/10/20  8:25 AM   Result Value Ref Range    Ventricular Rate 82 BPM    Atrial Rate 82 BPM    P-R Interval 166 ms    QRS Duration 102 ms    Q-T Interval 394 ms    QTC Calculation (Bezet) 460 ms    Calculated P Axis 44 degrees    Calculated R Axis -69 degrees    Calculated T Axis 57 degrees    Diagnosis       Normal sinus rhythm  Left anterior fascicular block  Abnormal ECG  When compared with ECG of 23-JUN-2020 11:25,  No significant change was found  Confirmed by GA EASTMAN (), Mary Castillo ([id number]) on 7/10/2020 9:14:07 AM           Patient Instructions:     Current Discharge Medication List      CONTINUE these medications which have CHANGED    Details   insulin aspart protamine/insulin aspart (NovoLOG Mix 70-30 U-100 Insuln) 100 unit/mL (70-30) injection 21 Units by SubCUTAneous route Before breakfast and dinner. Qty: 10 mL, Refills: 2         CONTINUE these medications which have NOT CHANGED    Details   clopidogreL (PLAVIX) 75 mg tab Take 1 Tab by mouth daily. Qty: 30 Tab, Refills: 5      aspirin 81 mg chewable tablet Take 1 Tab by mouth daily. Qty: 30 Tab, Refills: 0      atorvastatin (LIPITOR) 80 mg tablet Take 1 Tab by mouth nightly. Qty: 30 Tab, Refills: 3      carvediloL (COREG) 3.125 mg tablet Take 1 Tab by mouth two (2) times daily (with meals). Qty: 60 Tab, Refills: 3      lisinopriL (PRINIVIL, ZESTRIL) 5 mg tablet Take 1 Tab by mouth daily. Qty: 30 Tab, Refills: 3      nitroglycerin (NITROSTAT) 0.4 mg SL tablet 1 Tab by SubLINGual route every five (5) minutes as needed for Chest Pain. Up to 3 doses.   Qty: 1 Bottle, Refills: 1               Signed:  MONI Morrison  7/10/2020  11:17 AM

## 2020-07-10 NOTE — PROCEDURES
[facility]  CARDIAC CATH    Name:  Will Dacosta  MR#:  [id number]  :  1970  ACCOUNT #:  [de-identified]  DATE OF SERVICE:  07/10/2020    REFERRING PHYSICIAN:  Sidney Mullins MD    PRIMARY CARE PHYSICIAN:  None. PROCEDURES PERFORMED:  Left heart cath with coronary angiography and left ventriculogram, PTCA and stenting of the proximal circumflex using an [place] drug-eluting stent. PREOPERATIVE DIAGNOSES:  Recent inferior myocardial infarction status post extensive right coronary artery stenting with a high-grade left circumflex proximal lesion and he presents for staged intervention today. POSTOPERATIVE DIAGNOSES:  Recent inferior myocardial infarction status post extensive right coronary artery stenting with a high-grade left circumflex proximal lesion and he presents for staged intervention today. SURGEON:  Rivera Evangelista MD    ASSISTANT:  None. ESTIMATED BLOOD LOSS:  Less than 5 mL. SPECIMENS REMOVED:  None. COMPLICATIONS:  None. IMPLANTS:  A 3.0 x 15-mm Pancho drug-eluting stent in the proximal circumflex dilated to high pressure with a 3.25-mm noncompliant balloon. ANESTHESIA:  The patient was sedated with a frailty score of 3 by Shellie Saldivar RN, with 3 mg Versed, 50 mcg fentanyl and monitored from 9:48 to 10:19 a.m. PROCEDURE TECHNIQUE:  After informed consent was obtained, the patient was brought to the cath lab, prepped and draped in the usual fashion. A 6-Lao sheath was placed in the right radial artery via the micropuncture modified Seldinger technique and left heart catheterization performed using standard 5-Lao angled pigtail and Tiger catheter. Percutaneous intervention was performed through a 6-Lao XB3.0 guiding catheter utilizing Angiomax for anticoagulation with a therapeutic periprocedural ACT. He was loaded with 600 mg of Plavix. The patient tolerated the procedure well.   Access closure will be achieved using standard TR protocol. PRESSURE RESULTS:  Aorta 115/73. Left ventricle 115/15-20. LEFT VENTRICULOGRAM:  Reveals improved left ventricular systolic function. There is still mild global hypokinesis with ejection fraction estimated to be about 45% to 50%. There is no mitral regurgitation and no aortic valve gradient on catheter pullback. End-diastolic pressure is elevated. CORONARY ANATOMY:  The right coronary artery was engaged first.  The right coronary artery is extensively stented from the proximal right coronary artery all the way into the mid posterior descending branch, overlapping the ostium of the posterolateral branch. The stented region remains widely patent with no dissection, no thrombus, and ALEXANDRA 3 flow. The posterior descending branch distal to the stent looks great. The ostium and proximal posterolateral branch has mild irregularity to 20% but otherwise minimal irregularity. The left main has mild irregularity, dividing into an LAD and circumflex in the usual fashion. The LAD has mild irregularity diffusely. The circumflex has a focal previously mentioned 95% stenosis proximally. It gives off a 1-1.5-mm first obtuse marginal branch which has an ostial and proximal tandem 80% to 90% lesion. However, this vessel is very small. The second obtuse marginal branch is a continuation of the circumflex proper and has tandem ostial and proximal irregular 30% lesions which will be left to medical therapy. PERCUTANEOUS INTERVENTION REPORT:  After systemic anticoagulation with Angiomax and verification of therapeutic ACT, a Runthrough wire was advanced into the second obtuse marginal branch and the high-grade proximal lesion was predilated with a 2.5-mm compliant balloon with multiple inflations to 16 atmospheres.   We then placed a 3.0 x 15-mm Pancho drug-eluting stent in the proximal circumflex and dilated it to 16 atmospheres, post dilating with a 3.25-mm noncompliant balloon to 16 atmospheres at the stent margins and 20 atmospheres in the mid part of the stent. There was excellent angiographic result with 0% residual stenosis, no dissection, and ALEXANDRA 3 flow. The patient tolerated the procedure well. CONCLUSIONS:  Successful PTCA and stenting to the left circumflex as part of a staged procedure status post inferior MI with extensive RCA stenting several weeks ago. Chepe Ryan MD      [id number]  D:  07/10/2020 10:27  T:  07/10/2020 15:18  JOB #:  [id number]  CC:   Marco Khan MD

## 2020-07-10 NOTE — CONSULTS
Consult    Patient: Bhanu Weber MRN: [id number]  SSN: [id number]    YOB: 1970  Age: 44 y.o. Sex: male      Subjective:      Bhanu Weber is a 44 y.o. male who is being seen for DM management. The patient has a PMH of HTN, DM with neuropathy on insulin, non compliance to treatment; who was admitted by cardiology as outpatient in view of PCI-Lcx. The procedure was successful, no complications noted. The patient stated his FS readings at home are usually > 500 mg most of the time. He is on Novolog 70-30, 18 units sq before dinner. He was at some point on metformin and another po med he could not remember and is adamant of not being started on diabetic po medications. He had side effects to that according to him ( erectile dysfunction ) and does not want any. I tried multiple times to encourage a better Glycemic control with combination with pills + insulin; or just insulin, but this will have to be adjusted and be seen by a PCP. DM educator evaluated him, and will try to obtain vouchers for insulin. He may use novolog 70/30, up to 21 units sq bid. He complains of chronic pain over his legs, fatigue and is unable to enjoy motorcycle rides, horse rides in view to persistent pain. I offered gabapentin, which he accepted. Past Medical History:   Diagnosis Date    CAD (coronary artery disease)     Contact dermatitis and eczema due to cause     Diabetes ([facility]     Hypertension     Other ill-defined conditions(799.89)     high cholesterol     Past Surgical History:   Procedure Laterality Date    HX APPENDECTOMY      HX OTHER SURGICAL      hemorrhoidectomy      No family history on file.   Social History     Tobacco Use    Smoking status: Current Every Day Smoker     Packs/day: 0.50    Smokeless tobacco: Never Used   Substance Use Topics    Alcohol use: Yes     Comment: socially      Current Facility-Administered Medications   Medication Dose Route Frequency Provider Last Rate Last Dose    0.9% sodium chloride infusion  75 mL/hr IntraVENous CONTINUOUS Honorio García MD 75 mL/hr at 07/10/20 0812 75 mL/hr at 07/10/20 8745    aspirin chewable tablet 324 mg  324 mg Oral ONCE Honorio García MD   Stopped at 07/10/20 0800    saline peripheral flush soln 5 mL  5 mL InterCATHeter PRN Honorio García MD        fentaNYL citrate (PF) injection 25-50 mcg  25-50 mcg IntraVENous Multiple Honorio García MD   25 mcg at 07/10/20 5111    midazolam (VERSED) injection 0.5-2 mg  0.5-2 mg IntraVENous Multiple Honorio García MD   1 mg at 07/10/20 1928    heparin (PF) 2 units/ml in NS infusion  2 Units/hr IntraarTERial CONTINUOUS Honorio García MD   Stopped at 07/10/20 1016    bivalirudin (ANGIOMAX) 250 mg in 0.9% sodium chloride (MBP/ADV) 50 mL infusion  1.75 mg/kg/hr IntraVENous CONTINUOUS Honorio García MD   Stopped at 07/10/20 1017    alum-mag hydroxide-simeth (MYLANTA) oral suspension 30 mL  30 mL Oral PRN Honorio García MD   30 mL at 07/10/20 1012    0.9% sodium chloride infusion  75 mL/hr IntraVENous CONTINUOUS Gary Kwon MD        sodium chloride (NS) flush 5-40 mL  5-40 mL IntraVENous Q8H Gary Kwon MD        sodium chloride (NS) flush 5-40 mL  5-40 mL IntraVENous PRN Honorio García MD        acetaminophen (TYLENOL) tablet 650 mg  650 mg Oral Q4H PRN Honorio García MD        morphine injection 2 mg  2 mg IntraVENous Q4H PRN Honorio García MD        nitroglycerin (NITROSTAT) tablet 0.4 mg  0.4 mg SubLINGual Q5MIN PRN Honorio García MD        [START ON 7/11/2020] aspirin chewable tablet 81 mg  81 mg Oral DAILY Gary Kwon MD        LORazepam (ATIVAN) tablet 1 mg  1 mg Oral Q6H PRN Honorio García MD        insulin lispro (HUMALOG) injection   SubCUTAneous AC&HS Honorio García MD   15 Units at 07/10/20 1135    insulin NPH (NOVOLIN N, HUMULIN N) injection 10 Units  10 Units SubCUTAneous ONCE Ryan Mueller NP Allergies   Allergen Reactions    Bactrim [Sulfamethoprim] Other (comments)     Burns me inside out    Sulfa (Sulfonamide Antibiotics) Rash       Review of Systems:  A comprehensive review of systems was negative except for that written in the History of Present Illness. Objective:     Vitals:    07/10/20 1230 07/10/20 1240 07/10/20 1250 07/10/20 1300   BP: 103/71   117/85   Pulse: 84 75 73 81   Resp:       SpO2: 99%   98%   Weight:       Height:            Physical Exam:  GENERAL: alert, cooperative, no distress, appears stated age  EYE: negative  LYMPHATIC: Cervical, supraclavicular, and axillary nodes normal.   THROAT & NECK: normal and no erythema or exudates noted. LUNG: clear to auscultation bilaterally  HEART: regular rate and rhythm, S1, S2 normal, no murmur, click, rub or gallop  ABDOMEN: soft, non-tender. Bowel sounds normal. No masses,  no organomegaly  EXTREMITIES:  extremities normal, atraumatic, no cyanosis or edema  SKIN: Normal.  NEUROLOGIC: oriented x 3, able to move all 4 limbs, decreased sensitivity over lower extremities   PSYCHIATRIC: non focal    Assessment:     Active Hospital Problems    Diagnosis Date Noted    Type 2 diabetes mellitus with neurologic complication, with long-term current use of insulin ([facility] 07/10/2020       Plan:   -Uncontrolled type 2 DM, hyperglycemia and neuropathy:  On novolog 70/30, 18 units sq before dinner  Refuses PO DM meds at all   DM educator has seen him and will try to obtain vouchers for humulin Pen 21 units sq bid   Currently FS is 334.  He received 10 units sq of novolin N post procedure  Hba1c of 12% on 6/21/20  Will increase novolog 70/30 to 21 units sq bid while here   Start gabapentin 300 mg tid  PCP follow up to continue to address hyperglycemia and avoid further neuropathy and complications of long-standing uncontrolled DM    -CAD, sp PIC-Lcx  On asa, plavix and brillinta  Cardiology is primary    Thank you for allowing us to participate in the care of this patient. I am signing off. Call us prn.      Signed By: Maru Osullivan MD     July 10, 2020

## 2020-07-10 NOTE — DIABETES MGMT
Patient seen for follow up diabetes education. Patient given educational material, \"Diabetes Self-Management: A Patient Teaching Guide\", which was reviewed with patient. Explained basic physiology of diabetes, as well as causes, signs and symptoms, and treatments for hypoglycemia and hyperglycemia. Described the effects of poor glycemic control and the development of long-term complications such as renal, eye, nerve, and cardiovascular disease. Described proper diabetic foot care and the importance of checking feet daily. Patient voices numbness and tingling in feet. Per patient they typically drink juice, gatorade, milk. Reviewed alternative beverages to help improve glycemic control. Per patient they typically eat popsicles, ice cream, patient states \"I snack all day and eat 1 main meal at night. \" Patient states he has never tried sugar free popsicles. Also discussed lower carb ice cream options and the importance of portion control. Educated re: effects of carbohydrates on blood glucose, the \"plate method\" of healthy meal planning, basics of healthy meal plan, Consistent Carbohydrate Diet, discussed the basics of carb counting and how to read a nutrition label. Educated patient regarding the benefits of physical activity (as cleared by provider) on glycemic control. Also explained the relationship between hyperglycemia and infection and delayed healing. Discussed target goals for blood glucose and A1C. Educated patient regarding diabetic medications including mechanism of action, timing, and possible side effects. Patient refusing all oral medications stating \"pills aren't any good for you to take and they cause all the side effects. \" Educated patient that uncontrolled diabetes and noncompliance with medication can also lead to complications and death. Patient states \"I take my insulin. \" However patient has admitted to running out of insulin in the past for extended periods of time.  Educated patient on the difference betweeen mixed insulin and basal/bolus insulin as per chart review it appears patient was given samples of a long acting insulin pen and farxiga. Patient states he refuses to take farxiga. Patient verbalizes understanding of teaching. Patient given glucometer with 20 strips and 100 lancets as patient states he did not have one and is self pay. Encouraged patient to check FSBS minimum BID in the morning and evening, record in log book, and follow up with PCP within a week. Patient is uninsured but would likely benefit from continued diabetes outpatient classes. Encouraged patient to seek out free classes at free clinic. Patient also given contact information regarding available DSME for uninsured patients. At this time patient now states he hasn't taken 70/30 insulin \"in years. \" Patient was given insulin affordability programs. At first patient stated \"I don't want to call them right now I'm hurting. \" Provider aware of patient pain. Patient now states he will refuse Gabapentin. Educated patient that if he does not look into assistance program he will not be able to have basal/bolus insulin at home as hospital is unable to voucher these and patient is to discharge home later today. Patient states he will call at this time. Per patient he qualified for an immediate supply coupon for 1 month of Humilin 70/30 insulin pens. Patient states he has pen needles at home. Patient awaiting call back to see if he qualifies for any other assistance program for longterm. Patient states he has an email that they could send immediate supply coupon to, noted patient has smart phone at bedside. Provider and primary RN updated regarding insulin affordability program.    Encouraged compliance with discharge regimen. Encouraged patient to continue to work on lifestyle modifications and to follow up with primary care provider for further titration of regimen.  Patient verbalized understanding and voices no further questions regarding diabetes management.

## 2020-07-10 NOTE — PROGRESS NOTES
TRANSFER - OUT REPORT:    R radial planned PCI with Dr Cici Cruz 3 mg  Fentanyl 50 mcg  Angiomax off at 1017  Plavix 600 mg  Mylanta   Stent to the Circ  TR band 12 ml  No bleeding or hematoma noted at site. Site soft    Verbal report given to Frank(name) on Zuly Graves  being transferred to cpru(unit) for routine progression of care       Report consisted of patients Situation, Background, Assessment and   Recommendations(SBAR). Information from the following report(s) Procedure Summary was reviewed with the receiving nurse. Lines:   Peripheral IV 07/10/20 Right Antecubital (Active)       Peripheral IV 07/10/20 Left Antecubital (Active)        Opportunity for questions and clarification was provided.       Patient transported with:   Registered Nurse

## 2020-07-10 NOTE — DIABETES MGMT
Patient s/p cath planned for discharge back home. Admitting blood glucose 495. Patient has received Humalog 30 units. Most recent FSBS 334. HbA1c 12.2. Patient seen for assessment regarding diabetes management. Patient has a past medical history of DM, HTn, dyslipidemia, hepatitis C, STEMI, HF. Patient states they were diagnosed \"over 20\" years ago with diabetes and voices a positive family history of diabetes. Patient states they do not have a working glucometer with supplies at home, however per chart review patient had stated his blood glucose \"runs above 500 every day. \" Patient states they are currently taking insulin 18 units daily at home for management of diabetes. Patient states he uses insulin pens. Patient states he have pens at home in his refrigerator. Patient has not attended formal diabetes education in the past. Patient reports difficulty with affording their diabetic supplies, stating \"I can't afford that\" when educator was discussed ReliOn insulins. Patient given handouts regarding ReliOn insulins, over-the-counter glucometers, and insulin affordability programs. During conversation patient rarely opens eyes to speak to educator and each question has to be asked twice by educator to receive a response. Patient states there is no one for educator to call to update regarding his diabetes. Per patient he lives with Estelita Kahn friend. \" Patient states he has no source of income. Updated provider discussed weight based insulin options. Provider plans to discharge patient on an increase in BID 70/30 insulin since this is the insulin patient told primary RN he was taking. New order also received from provider to give NPH 10 units once at this time.

## 2020-07-10 NOTE — DISCHARGE INSTRUCTIONS
POST PCI/STENT DISCHARGE INSTRUCTIONS    1. Check puncture site frequently for swelling or bleeding. If there is any bleeding, lie down and apply pressure over the area with a clean towel or washcloth and call 911. Notify your doctor for any redness, swelling, drainage, or oozing from the puncture site. Notify your doctor for any fever or chills. 2. If the extremity becomes cold, numb, or painful call [facility] at [phone].    3. Activity should be limited for the next 48-72 hours. No heavy lifting (anything over 10 pounds) for 3 days. No pushing or pulling with right wrist for the next three days. 4.  You may resume your usual diet. Drink more fluids than usual.    5.  Have a responsible person drive you home and stay with you for at least 24 hours after your heart catheterization/angiography. No driving for 24 hours. 6. You may remove bandage from your right wrist in 24 hours. You may shower in 24 hours. No tub baths, hot tubs, or swimming for 1 week. Do not place any lotions, creams, powders, or ointments over puncture site for 1 week. You may place a clean band-aid over the puncture site each day for 5 days. Change daily. YOU ARE BEING DISCHARGED ON TWO ANTI-PLATELET MEDICATIONS    1- plavix 75mg by mouth daily    2- aspirin 81mg by mouth daily    THESE MEDICATIONS  ARE VERY IMPORTANT TO YOUR RECOVERY AND  MUST BE TAKEN EXACTLY AS PRESCRIBED & NOT STOPPED FOR ANY REASON. THESE MAY ONLY BE STOPPED WITH AN ORDER FROM YOUR CARDIOLOGIST. PLEASE HAVE THESE FILLED IMMEDIATELY AND START TAKING tomorrow 7/11/20. YOU ARE ALSO BEING DISCHARGED ON A MEDICATION FOR CHOLESTEROL MANAGEMENT. 1- Atorvastatin 80mg by mouth daily      You have also been referred to [facility]. Someone from Cardiac Rehab will be calling you to set up a follow up appointment. If they have not called you within a week,  please call [phone].               Percutaneous Coronary Intervention: What to Expect at [facility]     Percutaneous coronary intervention (PCI) is the name for procedures that are used to open a narrowed or blocked coronary artery. The two most common PCI procedures are coronary angioplasty and coronary stent placement. Your groin or arm may have a bruise and feel sore for a day or two after a percutaneous coronary intervention (PCI). You can do light activities around the house, but nothing strenuous for several days. This care sheet gives you a general idea about how long it will take for you to recover. But each person recovers at a different pace. Follow the steps below to get better as quickly as possible. How can you care for yourself at home? Activity  · Do not do strenuous exercise and do not lift, pull, or push anything heavy until your doctor says it is okay. This may be for a day or two. You can walk around the house and do light activity, such as cooking. · You may shower 24 to 48 hours after the procedure, if your doctor okays it. Pat the incision dry. Do not take a bath for 1 week, or until your doctor tells you it is okay. · If the catheter was placed in your groin, try not to walk up stairs for the first couple of days. · If the catheter was placed in your arm near your wrist, do not bend your wrist deeply for the first couple of days. Be careful using your hand to get into and out of a chair or bed. · If your doctor recommends it, get more exercise. Walking is a good choice. Bit by bit, increase the amount you walk every day. Try for at least 30 minutes on most days of the week. Diet  · Drink plenty of fluids to help your body flush out the dye. If you have kidney, heart, or liver disease and have to limit fluids, talk with your doctor before you increase the amount of fluids you drink. · Keep eating a heart-healthy diet that has lots of fruits, vegetables, and whole grains. If you have not been eating this way, talk to your doctor.  You also may want to talk to a dietitian. This expert can help you to learn about healthy foods and plan meals. Medicines  · Your doctor will tell you if and when you can restart your medicines. He or she will also give you instructions about taking any new medicines. · If you take blood thinners, such as warfarin (Coumadin), clopidogrel (Plavix), or aspirin, be sure to talk to your doctor. He or she will tell you if and when to start taking those medicines again. Make sure that you understand exactly what your doctor wants you to do. · Your doctor will prescribe blood-thinning medicines. You will likely take aspirin plus another antiplatelet, such as clopidogrel (Plavix). It is very important that you take these medicines exactly as directed. These medicines help keep the coronary artery open and reduce your risk of a heart attack. · Call your doctor if you think you are having a problem with your medicine. Care of the catheter site  · For 1 or 2 days, keep a bandage over the spot where the catheter was inserted. The bandage probably will fall off in this time. · Put ice or a cold pack on the area for 10 to 20 minutes at a time to help with soreness or swelling. Put a thin cloth between the ice and your skin. Follow-up care is a key part of your treatment and safety. Be sure to make and go to all appointments, and call your doctor if you are having problems. It's also a good idea to know your test results and keep a list of the medicines you take. When should you call for help? Call 911 anytime you think you may need emergency care. For example, call if:  · You passed out (lost consciousness). · You have severe trouble breathing. · You have sudden chest pain and shortness of breath, or you cough up blood. · You have symptoms of a heart attack, such as:  ¨ Chest pain or pressure. ¨ Sweating. ¨ Shortness of breath. ¨ Nausea or vomiting.   ¨ Pain that spreads from the chest to the neck, jaw, or one or both shoulders or arms. ¨ Dizziness or lightheadedness. ¨ A fast or uneven pulse. After calling 911, chew 1 adult-strength aspirin. Wait for an ambulance. Do not try to drive yourself. · You have been diagnosed with angina, and you have angina symptoms that do not go away with rest or are not getting better within 5 minutes after you take one dose of nitroglycerin. Call your doctor now or seek immediate medical care if:  · You are bleeding from the area where the catheter was put in your artery. · You have a fast-growing, painful lump at the catheter site. · You have signs of infection, such as:  ¨ Increased pain, swelling, warmth, or redness. ¨ Red streaks leading from the catheter site. ¨ Pus draining from the catheter site. ¨ A fever. · Your leg or arm looks blue or feels cold, numb, or tingly. Watch closely for changes in your health, and be sure to contact your doctor if you have any problems. Where can you learn more? Go to Adjudica.be  Enter X594 in the search box to learn more about \"Percutaneous Coronary Intervention: What to Expect at Home. \"   © 7794-8917 Healthwise, Incorporated. Care instructions adapted under license by [employer] (which disclaims liability or warranty for this information). This care instruction is for use with your licensed healthcare professional. If you have questions about a medical condition or this instruction, always ask your healthcare professional. [place] [address] any warranty or liability for your use of this information. Content Version: 22.2.322529; Current as of: May 22, 2015         Cardiac Rehabilitation: Care Instructions  Your Care Instructions    Cardiac rehabilitation is a program for people who have a heart problem, such as a heart attack, heart failure, or a heart valve disease. The program includes exercise, lifestyle changes, education, and emotional support.  Cardiac rehab can help you improve the quality of your life through better overall health. It can help you lose weight and feel better about yourself. On your cardiac rehab team, you may have your doctor, a nurse specialist, a dietitian, and a physical therapist. They will design your cardiac rehab program specifically for you. You will learn how to reduce your risk for heart problems, how to manage stress, and how to eat a heart-healthy diet. By the end of the program, you will be ready to maintain a healthier lifestyle on your own. Follow-up care is a key part of your treatment and safety. Be sure to make and go to all appointments, and call your doctor if you are having problems. It's also a good idea to know your test results and keep a list of the medicines you take. How can you care for yourself at home? · Take your medicines exactly as prescribed. Call your doctor if you think you are having a problem with your medicine. You will get more details on the specific medicines your doctor prescribes. · Weigh yourself every day if your doctor tells you to. Watch for sudden weight gain. Weigh yourself on the same scale with the same amount of clothing at the same time of day. · Plan your meals so that you are eating heart-healthy foods. ¨ Eat a variety of foods daily. Fresh fruits and vegetables and whole-grains are good choices. ¨ Limit your fat intake, especially saturated and trans fat. ¨ Limit salt (sodium). ¨ Increase fiber in your diet. ¨ Limit alcohol. · Learn how to take your pulse so that you can track your heart rate during exercise. · Always check with your doctor before you begin a new exercise program.  · Warm up before you exercise and cool down afterward for at least 15 minutes each. This will help your heart gradually prepare for and recover from exercise and avoid pushing your heart too hard. · Stop exercising if you have any unusual discomfort, such as chest pain. · Do not smoke. Smoking can make heart problems worse.  If you need help quitting, talk to your doctor about stop-smoking programs and medicines. These can increase your chances of quitting for good. When should you call for help? Call 911 anytime you think you may need emergency care. For example, call if:  · You have severe trouble breathing. · You cough up pink, foamy mucus and you have trouble breathing. · You have symptoms of a heart attack. These may include:  ¨ Chest pain or pressure, or a strange feeling in the chest.  ¨ Sweating. ¨ Shortness of breath. ¨ Nausea or vomiting. ¨ Pain, pressure, or a strange feeling in the back, neck, jaw, or upper belly or in one or both shoulders or arms. ¨ Lightheadedness or sudden weakness. ¨ A fast or irregular heartbeat. After you call 911, the  may tell you to chew 1 adult-strength or 2 to 4 low-dose aspirin. Wait for an ambulance. Do not try to drive yourself. · You have angina symptoms (such as chest pain or pressure) that do not go away with rest or are not getting better within 5 minutes after you take a dose of nitroglycerin. · You have symptoms of a stroke. These may include:  ¨ Sudden numbness, tingling, weakness, or loss of movement in your face, arm, or leg, especially on only one side of your body. ¨ Sudden vision changes. ¨ Sudden trouble speaking. ¨ Sudden confusion or trouble understanding simple statements. ¨ Sudden problems with walking or balance. ¨ A sudden, severe headache that is different from past headaches. · You passed out (lost consciousness). Call your doctor now or seek immediate medical care if:  · You have new or increased shortness of breath. · You are dizzy or lightheaded, or you feel like you may faint. · You gain weight suddenly, such as 3 pounds or more in 2 to 3 days. (Your doctor may suggest a different range of weight gain.)  · You have increased swelling in your legs, ankles, or feet.   Watch closely for changes in your health, and be sure to contact your doctor if you have any problems. Where can you learn more? Go to http://xochtil-ming.info/. Enter L076 in the search box to learn more about \"Cardiac Rehabilitation: Care Instructions. \"  Current as of: May 5, 2016  Content Version: 11.1  © 9221-5233 [employer]. Care instructions adapted under license by [employer] (which disclaims liability or warranty for this information). If you have questions about a medical condition or this instruction, always ask your healthcare professional. [place] [address] any warranty or liability for your use of this information. MyChart Activation    Thank you for requesting access to Mediastay. Please follow the instructions below to securely access and download your online medical record. Mediastay allows you to send messages to your doctor, view your test results, renew your prescriptions, schedule appointments, and more. How Do I Sign Up? 1. In your internet browser, go to [email or web address]. 2. Click on the First Time User? Click Here link in the Sign In box. You will see the New Member Sign Up page. 3. Enter your Mediastay Access Code exactly as it appears below. You will not need to use this code after youve completed the sign-up process. If you do not sign up before the expiration date, you must request a new code. Mediastay Access Code: [email or web address]  Expires: 2020  7:41 AM (This is the date your Mediastay access code will )    4. Enter the last four digits of your Social Security Number (xxxx) and Date of Birth (mm/dd/yyyy) as indicated and click Submit. You will be taken to the next sign-up page. 5. Create a Mediastay ID. This will be your Mediastay login ID and cannot be changed, so think of one that is secure and easy to remember. 6. Create a Mediastay password. You can change your password at any time. 7. Enter your Password Reset Question and Answer.  This can be used at a later time if you forget your password. 8. Enter your e-mail address. You will receive e-mail notification when new information is available in [address] 9. Click Sign Up. You can now view and download portions of your medical record. 10. Click the Download Summary menu link to download a portable copy of your medical information. Additional Information    If you have questions, please visit the Frequently Asked Questions section of the [employer] website at [email or web address] Remember, [employer] is NOT to be used for urgent needs. For medical emergencies, dial 911.

## 2020-07-10 NOTE — PROGRESS NOTES
Patient received to [address] [place] [address] room # 15  Ambulatory from [facility]. Patient scheduled for [facility] today with Dr Magi Morelos. Procedure reviewed & questions answered, voiced good understanding consent obtained & placed on chart. All medications and medical history reviewed. Will prep patient per orders. Patient & family updated on plan of care. The patient has a fraility score of 3-MANAGING WELL, based on patient A&Ox3, patient able to ambulate to room without difficulty.

## 2020-07-10 NOTE — PROGRESS NOTES
Blood sugars uncontrolled at home. Sliding Scale insulin ordered, see orders. Will consult hospitalist and diabetic educator for diabetes management, per Dr. Farooq Galvin.

## 2020-07-10 NOTE — PROGRESS NOTES
Brief Cardiac Procedure Note    Patient: Aileen Marie MRN: [id number]  SSN: [id number]    YOB: 1970  Age: 44 y.o. Sex: male      Date of Procedure: 7/10/2020     Pre-procedure Diagnosis: Coronary Artery Disease    Post-procedure Diagnosis: Coronary Artery Disease    Reason for Procedure: Worsening Angina    Procedure: Left Heart Catheterization with Percutaneous Coronary Intervention    Brief Description of Procedure: staged PCI to LCX    Performed By: Maritza Vegas MD     Assistants: none    Anesthesia: Moderate Sedation    Estimated Blood Loss: Less than 10 mL      Specimens: None    Implants: None    Findings:     LV IMPROVED, MILD GLOBAL HK 50%, EDP 15-20, NO MR OR AV GRADIENT  LM MILD IRREG  LAD MILD IRREG  LCX PROX 95%  RCA STENTS WIDELY PATENT    3.0 X 15 ASHA TO 16  3.25NC  TO 12, 20, 16 PROX TO DISTAL  GOOD RESULT    RIGHT RADIAL  ASA/PLAVIX/BRLINTA    Complications: None    Recommendations: Continue medical therapy.     Signed By: Maritza Vegas MD     July 10, 2020

## 2020-07-13 NOTE — PROGRESS NOTES
SAME DAY DISCHARGE FOLLOW UP PHONE CALL           NAME : Namrata Garcia           : 1970                    DATE/TIME:   2020                           MRN# [id number]        1. Ensure that the patient has had their new prescriptions filled & ask if they have taken their anti-platelet & aspirin that day. Patient states that he has just woke up at 3 pm and has not taken any medication or gotten prescriptions filled. Patient re-educated on importance of taking anti platelets as well as medication for hyperglycemia as bloodsugar was over 500 on arrival yesterday. Patient states he will get prescriptions filled asap. Instructed patient to go to ER if bloodsugar over 500 again or experiences any chest pain. 2. Ask about access site. Have the patient assess for any signs of a hematoma. Ask about any pain at access site. Patient denies any bleeding or hematoma      3. Ask the patient if they had experienced any chest pain or shortness of breath.     Patient denies any chest pain or shortness of breath

## 2020-07-20 PROBLEM — Z95.5 S/P DRUG ELUTING CORONARY STENT PLACEMENT: Status: ACTIVE | Noted: 2020-07-20

## 2020-08-03 LAB — ACT BLD: >1000 SECS (ref 79–138)

## 2020-08-19 PROBLEM — E11.9 CONTROLLED TYPE 2 DIABETES MELLITUS WITHOUT COMPLICATION, WITH LONG-TERM CURRENT USE OF INSULIN (HCC): Status: ACTIVE | Noted: 2020-07-10

## 2020-08-19 PROBLEM — Z72.0 TOBACCO ABUSE: Status: ACTIVE | Noted: 2020-08-19

## 2020-08-20 LAB — ACT BLD: 252 SECS (ref 79–138)

## 2020-08-21 LAB — ACT BLD: >1000 SECS (ref 79–138)

## 2021-10-07 ENCOUNTER — HOSPITAL ENCOUNTER (EMERGENCY)
Age: 51
Discharge: HOME | End: 2021-10-08
Attending: EMERGENCY MEDICINE

## 2021-10-07 ENCOUNTER — APPOINTMENT (OUTPATIENT)
Dept: GENERAL RADIOLOGY | Age: 51
End: 2021-10-07
Attending: EMERGENCY MEDICINE

## 2021-10-07 DIAGNOSIS — R07.9 CHEST PAIN, UNSPECIFIED TYPE: ICD-10-CM

## 2021-10-07 DIAGNOSIS — Z91.14 NONCOMPLIANCE WITH MEDICATION REGIMEN: Primary | ICD-10-CM

## 2021-10-07 DIAGNOSIS — E10.42 DIABETIC POLYNEUROPATHY ASSOCIATED WITH TYPE 1 DIABETES MELLITUS (HCC): ICD-10-CM

## 2021-10-07 LAB
ALBUMIN SERPL-MCNC: 3.7 G/DL (ref 3.5–5)
ALBUMIN/GLOB SERPL: 0.9 {RATIO} (ref 1.2–3.5)
ALP SERPL-CCNC: 100 U/L (ref 50–136)
ALT SERPL-CCNC: 22 U/L (ref 12–65)
ANION GAP SERPL CALC-SCNC: 5 MMOL/L (ref 7–16)
AST SERPL-CCNC: 11 U/L (ref 15–37)
BASOPHILS # BLD: 0.1 K/UL (ref 0–0.2)
BASOPHILS NFR BLD: 1 % (ref 0–2)
BILIRUB SERPL-MCNC: 0.2 MG/DL (ref 0.2–1.1)
BUN SERPL-MCNC: 13 MG/DL (ref 6–23)
CALCIUM SERPL-MCNC: 9.2 MG/DL (ref 8.3–10.4)
CHLORIDE SERPL-SCNC: 105 MMOL/L (ref 98–107)
CO2 SERPL-SCNC: 29 MMOL/L (ref 21–32)
CREAT SERPL-MCNC: 0.9 MG/DL (ref 0.8–1.5)
DIFFERENTIAL METHOD BLD: ABNORMAL
EOSINOPHIL # BLD: 0.2 K/UL (ref 0–0.8)
EOSINOPHIL NFR BLD: 2 % (ref 0.5–7.8)
ERYTHROCYTE [DISTWIDTH] IN BLOOD BY AUTOMATED COUNT: 13.6 % (ref 11.9–14.6)
GLOBULIN SER CALC-MCNC: 4.1 G/DL (ref 2.3–3.5)
GLUCOSE BLD STRIP.AUTO-MCNC: 253 MG/DL (ref 65–100)
GLUCOSE SERPL-MCNC: 222 MG/DL (ref 65–100)
HCT VFR BLD AUTO: 45.9 % (ref 41.1–50.3)
HGB BLD-MCNC: 14.8 G/DL (ref 13.6–17.2)
IMM GRANULOCYTES # BLD AUTO: 0 K/UL (ref 0–0.5)
IMM GRANULOCYTES NFR BLD AUTO: 0 % (ref 0–5)
LIPASE SERPL-CCNC: 119 U/L (ref 73–393)
LYMPHOCYTES # BLD: 5 K/UL (ref 0.5–4.6)
LYMPHOCYTES NFR BLD: 52 % (ref 13–44)
MAGNESIUM SERPL-MCNC: 2.1 MG/DL (ref 1.8–2.4)
MCH RBC QN AUTO: 27.1 PG (ref 26.1–32.9)
MCHC RBC AUTO-ENTMCNC: 32.2 G/DL (ref 31.4–35)
MCV RBC AUTO: 83.9 FL (ref 79.6–97.8)
MONOCYTES # BLD: 0.4 K/UL (ref 0.1–1.3)
MONOCYTES NFR BLD: 5 % (ref 4–12)
NEUTS SEG # BLD: 3.8 K/UL (ref 1.7–8.2)
NEUTS SEG NFR BLD: 40 % (ref 43–78)
NRBC # BLD: 0 K/UL (ref 0–0.2)
PLATELET # BLD AUTO: 274 K/UL (ref 150–450)
PMV BLD AUTO: 9.7 FL (ref 9.4–12.3)
POTASSIUM SERPL-SCNC: 3.9 MMOL/L (ref 3.5–5.1)
PROT SERPL-MCNC: 7.8 G/DL (ref 6.3–8.2)
RBC # BLD AUTO: 5.47 M/UL (ref 4.23–5.6)
SERVICE CMNT-IMP: ABNORMAL
SODIUM SERPL-SCNC: 139 MMOL/L (ref 138–145)
TROPONIN-HIGH SENSITIVITY: 9.3 PG/ML (ref 0–14)
WBC # BLD AUTO: 9.5 K/UL (ref 4.3–11.1)

## 2021-10-07 PROCEDURE — 96361 HYDRATE IV INFUSION ADD-ON: CPT

## 2021-10-07 PROCEDURE — 96374 THER/PROPH/DIAG INJ IV PUSH: CPT

## 2021-10-07 PROCEDURE — 82962 GLUCOSE BLOOD TEST: CPT

## 2021-10-07 PROCEDURE — 71046 X-RAY EXAM CHEST 2 VIEWS: CPT

## 2021-10-07 PROCEDURE — 99285 EMERGENCY DEPT VISIT HI MDM: CPT

## 2021-10-07 PROCEDURE — 84484 ASSAY OF TROPONIN QUANT: CPT

## 2021-10-07 PROCEDURE — 80053 COMPREHEN METABOLIC PANEL: CPT

## 2021-10-07 PROCEDURE — 83735 ASSAY OF MAGNESIUM: CPT

## 2021-10-07 PROCEDURE — 85025 COMPLETE CBC W/AUTO DIFF WBC: CPT

## 2021-10-07 PROCEDURE — 83690 ASSAY OF LIPASE: CPT

## 2021-10-07 PROCEDURE — 74011636637 HC RX REV CODE- 636/637: Performed by: EMERGENCY MEDICINE

## 2021-10-07 PROCEDURE — 93005 ELECTROCARDIOGRAM TRACING: CPT | Performed by: EMERGENCY MEDICINE

## 2021-10-07 PROCEDURE — 74011250637 HC RX REV CODE- 250/637: Performed by: EMERGENCY MEDICINE

## 2021-10-07 RX ORDER — GABAPENTIN 100 MG/1
200 CAPSULE ORAL
Status: COMPLETED | OUTPATIENT
Start: 2021-10-07 | End: 2021-10-07

## 2021-10-07 RX ORDER — SODIUM CHLORIDE 0.9 % (FLUSH) 0.9 %
5-10 SYRINGE (ML) INJECTION AS NEEDED
Status: DISCONTINUED | OUTPATIENT
Start: 2021-10-07 | End: 2021-10-08 | Stop reason: HOSPADM

## 2021-10-07 RX ORDER — SODIUM CHLORIDE 0.9 % (FLUSH) 0.9 %
5-10 SYRINGE (ML) INJECTION EVERY 8 HOURS
Status: DISCONTINUED | OUTPATIENT
Start: 2021-10-07 | End: 2021-10-08 | Stop reason: HOSPADM

## 2021-10-07 RX ORDER — GUAIFENESIN 100 MG/5ML
324 LIQUID (ML) ORAL
Status: COMPLETED | OUTPATIENT
Start: 2021-10-07 | End: 2021-10-07

## 2021-10-07 RX ORDER — CLOPIDOGREL BISULFATE 75 MG/1
75 TABLET ORAL
Status: COMPLETED | OUTPATIENT
Start: 2021-10-07 | End: 2021-10-07

## 2021-10-07 RX ADMIN — INSULIN HUMAN 10 UNITS: 100 INJECTION, SOLUTION PARENTERAL at 23:57

## 2021-10-07 RX ADMIN — CLOPIDOGREL BISULFATE 75 MG: 75 TABLET ORAL at 23:55

## 2021-10-07 RX ADMIN — ASPIRIN 324 MG: 81 TABLET, CHEWABLE ORAL at 23:56

## 2021-10-07 RX ADMIN — GABAPENTIN 200 MG: 100 CAPSULE ORAL at 23:55

## 2021-10-08 VITALS
OXYGEN SATURATION: 98 % | RESPIRATION RATE: 16 BRPM | WEIGHT: 170 LBS | TEMPERATURE: 98.6 F | BODY MASS INDEX: 25.18 KG/M2 | HEIGHT: 69 IN | SYSTOLIC BLOOD PRESSURE: 142 MMHG | DIASTOLIC BLOOD PRESSURE: 84 MMHG | HEART RATE: 91 BPM

## 2021-10-08 LAB
ATRIAL RATE: 99 BPM
CALCULATED P AXIS, ECG09: 60 DEGREES
CALCULATED R AXIS, ECG10: -57 DEGREES
CALCULATED T AXIS, ECG11: 54 DEGREES
DIAGNOSIS, 93000: NORMAL
P-R INTERVAL, ECG05: 182 MS
Q-T INTERVAL, ECG07: 356 MS
QRS DURATION, ECG06: 94 MS
QTC CALCULATION (BEZET), ECG08: 456 MS
TROPONIN-HIGH SENSITIVITY: 11.1 PG/ML (ref 0–14)
VENTRICULAR RATE, ECG03: 99 BPM

## 2021-10-08 PROCEDURE — 74011250636 HC RX REV CODE- 250/636: Performed by: EMERGENCY MEDICINE

## 2021-10-08 PROCEDURE — 84484 ASSAY OF TROPONIN QUANT: CPT

## 2021-10-08 RX ORDER — CLOPIDOGREL BISULFATE 75 MG/1
75 TABLET ORAL DAILY
Qty: 30 TABLET | Refills: 0 | Status: SHIPPED | OUTPATIENT
Start: 2021-10-08 | End: 2021-11-07

## 2021-10-08 RX ORDER — INSULIN ASPART 100 [IU]/ML
21 INJECTION, SUSPENSION SUBCUTANEOUS
Qty: 10 ML | Refills: 2 | Status: SHIPPED | OUTPATIENT
Start: 2021-10-08

## 2021-10-08 RX ORDER — ROSUVASTATIN CALCIUM 20 MG/1
20 TABLET, COATED ORAL
Qty: 30 TABLET | Refills: 0 | Status: SHIPPED | OUTPATIENT
Start: 2021-10-08 | End: 2021-11-07

## 2021-10-08 RX ORDER — LISINOPRIL 5 MG/1
5 TABLET ORAL DAILY
Qty: 30 TABLET | Refills: 0 | Status: SHIPPED | OUTPATIENT
Start: 2021-10-08 | End: 2022-02-24 | Stop reason: SDUPTHER

## 2021-10-08 RX ORDER — CARVEDILOL 3.12 MG/1
3.12 TABLET ORAL 2 TIMES DAILY WITH MEALS
Qty: 60 TABLET | Refills: 0 | Status: SHIPPED | OUTPATIENT
Start: 2021-10-08 | End: 2021-11-07

## 2021-10-08 RX ADMIN — SODIUM CHLORIDE 500 ML: 900 INJECTION, SOLUTION INTRAVENOUS at 00:13

## 2021-10-08 NOTE — ED TRIAGE NOTES
Pt coming in for midsternal CP radiating down his left arm. Hx of MI last June. States he is supposed to be on a blood thinner and his insulin medication. States some SOB. Denies fever or cough. Hx of HTN.

## 2021-10-08 NOTE — ED NOTES
I have reviewed discharge instructions with the patient. The patient verbalized understanding. Patient left ED via Discharge Method: ambulatory to Home with self. Opportunity for questions and clarification provided. Patient given 4 scripts. To continue your aftercare when you leave the hospital, you may receive an automated call from our care team to check in on how you are doing. This is a free service and part of our promise to provide the best care and service to meet your aftercare needs.  If you have questions, or wish to unsubscribe from this service please call [phone]. Thank you for Choosing our [facility] Emergency Department.

## 2021-10-08 NOTE — DISCHARGE INSTRUCTIONS
You need to call the cardiologist and schedule a follow-up appointment as well as try calling the [facility]. Consult for case management has been placed and they will be trying to call the house phone where you are staying to try to help you with resources.

## 2021-10-08 NOTE — ED NOTES
RN at bedside; pt medicated with gabapentin, aspirin, Plavix, 10u insulin, and 500ml NS; pt IV infiltrated; new 20g IV placed in RAC; pt tolerated well; repeat Trop drawn and sent to lab; pt alert and oriented x4; breathing even and unlabored; no distress noted; pt given anselmo crackers; RN to continue to monitor

## 2021-10-08 NOTE — ED PROVIDER NOTES
Patient is a 57-year-old male presenting to the emergency department today complaining of chest pain which started around 6 PM this evening. The patient states that he has a history of coronary artery disease and had a cardiac catheterization and stent placement in June 2020 with 3 stents. In August that same year he had to be taken back to the Cath Lab and have another stent put in. He has not followed up with cardiology since November. The patient says since that time he lost his house and he has been living in a tent in a friend's backyard because there is no room in the house. He is a diabetic but he has been on insulin because he is taking someone else's insulin but ran out about a week ago. He has not been on his Plavix or blood pressure medicine for the last 5 to 6 months. The patient says that he is not able to go to the free clinic because he does not have his Social Security card. He also is concerned about increasing pain on the soles of the feet bilaterally. This has been progressive and not acute. Past Medical History:   Diagnosis Date    CAD (coronary artery disease)     Contact dermatitis and eczema due to cause     Diabetes ([facility] [address]     Hypertension     Other ill-defined conditions(799.89)     high cholesterol       Past Surgical History:   Procedure Laterality Date    HX APPENDECTOMY      HX OTHER SURGICAL      hemorrhoidectomy         No family history on file.     Social History     Socioeconomic History    Marital status:      Spouse name: Not on file    Number of children: Not on file    Years of education: Not on file    Highest education level: Not on file   Occupational History    Not on file   Tobacco Use    Smoking status: Current Every Day Smoker     Packs/day: 0.50    Smokeless tobacco: Never Used   Substance and Sexual Activity    Alcohol use: Yes     Comment: socially    Drug use: No    Sexual activity: Yes     Partners: Male   Other Topics Concern  Not on file   Social History Narrative    Not on file     Social Determinants of Health     Financial Resource Strain:     Difficulty of Paying Living Expenses:    Food Insecurity:     Worried About Running Out of Food in the Last Year:     [address] in the Last Year:    Transportation Needs:     Lack of Transportation (Medical):  Lack of Transportation (Non-Medical):    Physical Activity:     Days of Exercise per Week:     Minutes of Exercise per Session:    Stress:     Feeling of Stress :    Social Connections:     Frequency of Communication with Friends and Family:     Frequency of Social Gatherings with Friends and Family:     Attends Cheondoism Services:     Active Member of Clubs or Organizations:     Attends Club or Organization Meetings:     Marital Status:    Intimate Partner Violence:     Fear of Current or Ex-Partner:     Emotionally Abused:     Physically Abused:     Sexually Abused: ALLERGIES: Bactrim [sulfamethoprim] and Sulfa (sulfonamide antibiotics)    Review of Systems   Cardiovascular: Positive for chest pain. Musculoskeletal:        Foot pain   All other systems reviewed and are negative. Vitals:    10/07/21 2130   BP: (!) 149/94   Pulse: 96   Resp: 16   Temp: 98.2 °F (36.8 °C)   SpO2: 98%   Weight: 77.1 kg (170 lb)   Height: 5' 9\" (1.753 m)            Physical Exam     GENERAL:The patient has Body mass index is 25.1 kg/m². Well-hydrated. VITAL SIGNS: Heart rate, blood pressure, respiratory rate reviewed as recorded in  nurse's notes  EYES: Pupils reactive. Extraocular motion intact. No conjunctival redness or drainage. NECK: Supple, no meningeal signs. Trachea midline. No masses or thyromegaly. LUNGS: Breath sounds clear and equal bilaterally no accessory muscle use. CHEST: No deformity  CARDIOVASCULAR: Regular rate and rhythm  ABDOMEN: Soft without tenderness. No palpable masses or organomegaly. No  peritoneal signs.  No rigidity. EXTREMITIES: No clubbing or cyanosis. No joint swelling. Normal muscle tone. No  restricted range of motion appreciated. NEUROLOGIC: Sensation is grossly intact. Cranial nerve exam reveals face is  symmetrical, tongue is midline speech is clear. Subjective burning pain to the soles of the feet. SKIN: No rash or petechiae. Good skin turgor palpated. Patient's shoes, socks and feet are wet. No rash on the plantar aspect of the feet. PSYCHIATRIC: Alert and oriented. Appropriate behavior and judgment.       MDM  Number of Diagnoses or Management Options  Diagnosis management comments: CHF, COPD, pneumonia, PE,    MI, coronary artery disease, unstable angina, coronary artery disease,    Atrial fibrillation, cardiac arrhythmia, PVC, medication induced palpitations, heart block,  electrolyte induced palpitations,    Aortic dissection, aortic aneurysm,    GERD, musculoskeletal pain, costochondritis, rib fracture, pleurisy,    Diabetic neuropathy,       Amount and/or Complexity of Data Reviewed  Clinical lab tests: reviewed and ordered  Tests in the radiology section of CPT®: reviewed and ordered  Tests in the medicine section of CPT®: ordered and reviewed  Review and summarize past medical records: yes  Independent visualization of images, tracings, or specimens: yes           Procedures

## 2022-02-24 ENCOUNTER — APPOINTMENT (OUTPATIENT)
Dept: GENERAL RADIOLOGY | Age: 52
End: 2022-02-24
Attending: EMERGENCY MEDICINE

## 2022-02-24 ENCOUNTER — HOSPITAL ENCOUNTER (EMERGENCY)
Age: 52
Discharge: HOME | End: 2022-02-24
Attending: EMERGENCY MEDICINE

## 2022-02-24 VITALS
DIASTOLIC BLOOD PRESSURE: 103 MMHG | RESPIRATION RATE: 15 BRPM | TEMPERATURE: 98.1 F | SYSTOLIC BLOOD PRESSURE: 161 MMHG | HEART RATE: 93 BPM | HEIGHT: 69 IN | WEIGHT: 167 LBS | BODY MASS INDEX: 24.73 KG/M2 | OXYGEN SATURATION: 98 %

## 2022-02-24 DIAGNOSIS — Z65.9 POOR SOCIAL SITUATION: ICD-10-CM

## 2022-02-24 DIAGNOSIS — E10.42 DIABETIC POLYNEUROPATHY ASSOCIATED WITH TYPE 1 DIABETES MELLITUS (HCC): Primary | ICD-10-CM

## 2022-02-24 DIAGNOSIS — I10 HYPERTENSION, UNSPECIFIED TYPE: ICD-10-CM

## 2022-02-24 LAB
ALBUMIN SERPL-MCNC: 4 G/DL (ref 3.5–5)
ALBUMIN/GLOB SERPL: 0.9 {RATIO} (ref 1.2–3.5)
ALP SERPL-CCNC: 94 U/L (ref 50–136)
ALT SERPL-CCNC: 31 U/L (ref 12–65)
ANION GAP SERPL CALC-SCNC: 3 MMOL/L (ref 7–16)
AST SERPL-CCNC: 22 U/L (ref 15–37)
BASOPHILS # BLD: 0.1 K/UL (ref 0–0.2)
BASOPHILS NFR BLD: 1 % (ref 0–2)
BILIRUB SERPL-MCNC: 0.4 MG/DL (ref 0.2–1.1)
BILIRUB UR QL: NEGATIVE
BUN SERPL-MCNC: 11 MG/DL (ref 6–23)
CALCIUM SERPL-MCNC: 10 MG/DL (ref 8.3–10.4)
CHLORIDE SERPL-SCNC: 106 MMOL/L (ref 98–107)
CO2 SERPL-SCNC: 28 MMOL/L (ref 21–32)
CREAT SERPL-MCNC: 0.7 MG/DL (ref 0.8–1.5)
DIFFERENTIAL METHOD BLD: ABNORMAL
EOSINOPHIL # BLD: 0.1 K/UL (ref 0–0.8)
EOSINOPHIL NFR BLD: 2 % (ref 0.5–7.8)
ERYTHROCYTE [DISTWIDTH] IN BLOOD BY AUTOMATED COUNT: 13.7 % (ref 11.9–14.6)
GLOBULIN SER CALC-MCNC: 4.5 G/DL (ref 2.3–3.5)
GLUCOSE BLD STRIP.AUTO-MCNC: 111 MG/DL (ref 65–100)
GLUCOSE SERPL-MCNC: 108 MG/DL (ref 65–100)
GLUCOSE UR QL STRIP.AUTO: NEGATIVE MG/DL
HCT VFR BLD AUTO: 46 % (ref 41.1–50.3)
HGB BLD-MCNC: 15.4 G/DL (ref 13.6–17.2)
IMM GRANULOCYTES # BLD AUTO: 0 K/UL (ref 0–0.5)
IMM GRANULOCYTES NFR BLD AUTO: 0 % (ref 0–5)
KETONES UR-MCNC: NEGATIVE MG/DL
LEUKOCYTE ESTERASE UR QL STRIP: NEGATIVE
LYMPHOCYTES # BLD: 3.7 K/UL (ref 0.5–4.6)
LYMPHOCYTES NFR BLD: 51 % (ref 13–44)
MCH RBC QN AUTO: 28.2 PG (ref 26.1–32.9)
MCHC RBC AUTO-ENTMCNC: 33.5 G/DL (ref 31.4–35)
MCV RBC AUTO: 84.2 FL (ref 79.6–97.8)
MONOCYTES # BLD: 0.4 K/UL (ref 0.1–1.3)
MONOCYTES NFR BLD: 6 % (ref 4–12)
NEUTS SEG # BLD: 2.9 K/UL (ref 1.7–8.2)
NEUTS SEG NFR BLD: 40 % (ref 43–78)
NITRITE UR QL: NEGATIVE
NRBC # BLD: 0 K/UL (ref 0–0.2)
PH UR: 7 [PH] (ref 5–9)
PLATELET # BLD AUTO: 254 K/UL (ref 150–450)
PMV BLD AUTO: 10.1 FL (ref 9.4–12.3)
POTASSIUM SERPL-SCNC: 4.2 MMOL/L (ref 3.5–5.1)
PROT SERPL-MCNC: 8.5 G/DL (ref 6.3–8.2)
PROT UR QL: NEGATIVE MG/DL
RBC # BLD AUTO: 5.46 M/UL (ref 4.23–5.6)
RBC # UR STRIP: NEGATIVE /UL
SERVICE CMNT-IMP: ABNORMAL
SERVICE CMNT-IMP: NORMAL
SODIUM SERPL-SCNC: 137 MMOL/L (ref 138–145)
SP GR UR: 1.02 (ref 1–1.02)
UROBILINOGEN UR QL: 0.2 EU/DL (ref 0.2–1)
WBC # BLD AUTO: 7.2 K/UL (ref 4.3–11.1)

## 2022-02-24 PROCEDURE — 99284 EMERGENCY DEPT VISIT MOD MDM: CPT

## 2022-02-24 PROCEDURE — 82962 GLUCOSE BLOOD TEST: CPT

## 2022-02-24 PROCEDURE — 74011250636 HC RX REV CODE- 250/636: Performed by: EMERGENCY MEDICINE

## 2022-02-24 PROCEDURE — 80053 COMPREHEN METABOLIC PANEL: CPT

## 2022-02-24 PROCEDURE — 73630 X-RAY EXAM OF FOOT: CPT

## 2022-02-24 PROCEDURE — 81003 URINALYSIS AUTO W/O SCOPE: CPT

## 2022-02-24 PROCEDURE — 85025 COMPLETE CBC W/AUTO DIFF WBC: CPT

## 2022-02-24 RX ORDER — NITROGLYCERIN 0.4 MG/1
0.4 TABLET SUBLINGUAL
Qty: 1 EACH | Refills: 1 | Status: SHIPPED | OUTPATIENT
Start: 2022-02-24

## 2022-02-24 RX ORDER — SODIUM CHLORIDE 0.9 % (FLUSH) 0.9 %
5-10 SYRINGE (ML) INJECTION EVERY 8 HOURS
Status: DISCONTINUED | OUTPATIENT
Start: 2022-02-24 | End: 2022-02-24 | Stop reason: HOSPADM

## 2022-02-24 RX ORDER — CARVEDILOL 3.12 MG/1
3.12 TABLET ORAL 2 TIMES DAILY WITH MEALS
Qty: 60 TABLET | Refills: 2 | Status: SHIPPED | OUTPATIENT
Start: 2022-02-24

## 2022-02-24 RX ORDER — SODIUM CHLORIDE 0.9 % (FLUSH) 0.9 %
5-10 SYRINGE (ML) INJECTION AS NEEDED
Status: DISCONTINUED | OUTPATIENT
Start: 2022-02-24 | End: 2022-02-24 | Stop reason: HOSPADM

## 2022-02-24 RX ORDER — GABAPENTIN 300 MG/1
300 CAPSULE ORAL 2 TIMES DAILY
Qty: 60 CAPSULE | Refills: 1 | Status: SHIPPED | OUTPATIENT
Start: 2022-02-24

## 2022-02-24 RX ORDER — LISINOPRIL 5 MG/1
5 TABLET ORAL DAILY
Qty: 30 TABLET | Refills: 2 | Status: SHIPPED | OUTPATIENT
Start: 2022-02-24

## 2022-02-24 RX ADMIN — SODIUM CHLORIDE 1000 ML: 900 INJECTION, SOLUTION INTRAVENOUS at 15:59

## 2022-02-24 NOTE — ED TRIAGE NOTES
Pt arrives via EMS from home. Called out for high blood sugar. EMS reports BGL reading HI on their machine. Pt is a T2D. VS: 144/90, HR 92, 97% RA. States 12 lead negative. 18 g L AC. Pt states he has been taking levemir and humlin but states he has been out of regular insulin.  Does not have a meter at home to check BGL

## 2022-02-24 NOTE — ED NOTES
Pt  and 114 in triage. Pt states he took 16 units of humlin prior to EMS getting there. EMS reports BGL reading \"HI\". NAD. Masked.

## 2022-02-24 NOTE — ED PROVIDER NOTES
73-year-old male has a history of hypertension diabetes. Also history coronary artery disease with MI in the past.  He has had stents placed. Review of records reveals inferior MI approximately year and a half ago. Patient is brought in by EMS because he felt his blood sugar was high. He cannot take at home. Has been taken some of the types of insulin. States he is out of his heart medications. He does not have a regular doctor. Complains of pain in his feet and \"lumps in his feet\". No recent trauma. No particular issues with chest pain or cough or shortness of breath. Patient states he does not have a regular doctor and is having transportation to any doctors. Old records are reviewed. Patient has been seen in some free clinics in the past.  Evidently some memos that he has been a unable to be seen in one or two clinics because he does not have a so security car. He evidently lives in [place] and has been seen in a clinic in [place]. Chronic bilateral lower extremity pain. The history is provided by the patient. High Blood Sugar   This is a new problem. The current episode started 6 to 12 hours ago. The problem occurs constantly. The problem has not changed since onset. The pain is moderate. Associated symptoms include arthralgias and myalgias. Pertinent negatives include no diarrhea, no nausea, no vomiting and no chest pain. The pain is relieved by nothing. His past medical history does not include gallstones. Past Medical History:   Diagnosis Date    CAD (coronary artery disease)     Contact dermatitis and eczema due to cause     Diabetes ([facility] [place] [address]     Hypertension     Other ill-defined conditions(799.89)     high cholesterol       Past Surgical History:   Procedure Laterality Date    HX APPENDECTOMY      HX OTHER SURGICAL      hemorrhoidectomy         No family history on file.     Social History     Socioeconomic History    Marital status:      Spouse name: Not on file    Number of children: Not on file    Years of education: Not on file    Highest education level: Not on file   Occupational History    Not on file   Tobacco Use    Smoking status: Current Every Day Smoker     Packs/day: 0.50    Smokeless tobacco: Never Used   Substance and Sexual Activity    Alcohol use: Yes     Comment: socially    Drug use: No    Sexual activity: Yes     Partners: Male   Other Topics Concern    Not on file   Social History Narrative    Not on file     Social Determinants of Health     Financial Resource Strain:     Difficulty of Paying Living Expenses: Not on file   Food Insecurity:     Worried About Running Out of Food in the Last Year: Not on file    Vincenzo of Food in the Last Year: Not on file   Transportation Needs:     Lack of Transportation (Medical): Not on file    Lack of Transportation (Non-Medical): Not on file   Physical Activity:     Days of Exercise per Week: Not on file    Minutes of Exercise per Session: Not on file   Stress:     Feeling of Stress : Not on file   Social Connections:     Frequency of Communication with Friends and Family: Not on file    Frequency of Social Gatherings with Friends and Family: Not on file    Attends Latter day Services: Not on file    Active Member of [place] Group or Organizations: Not on file    Attends Club or Organization Meetings: Not on file    Marital Status: Not on file   Intimate Partner Violence:     Fear of Current or Ex-Partner: Not on file    Emotionally Abused: Not on file    Physically Abused: Not on file    Sexually Abused: Not on file   Housing Stability:     Unable to Pay for Housing in the Last Year: Not on file    Number of [place] in the Last Year: Not on file    Unstable Housing in the Last Year: Not on file         ALLERGIES: Bactrim [sulfamethoprim] and Sulfa (sulfonamide antibiotics)    Review of Systems   Cardiovascular: Negative for chest pain.    Gastrointestinal: Negative for diarrhea, nausea and vomiting. Musculoskeletal: Positive for arthralgias and myalgias. Vitals:    02/24/22 1504 02/24/22 1554   BP: (!) 155/97    Pulse: 100    Resp: 16    Temp: 98.1 °F (36.7 °C)    SpO2: 100% 97%   Weight: 75.8 kg (167 lb)    Height: 5' 9\" (1.753 m)             Physical Exam  Vitals and nursing note reviewed. Constitutional:       Appearance: He is not ill-appearing. Cardiovascular:      Rate and Rhythm: Normal rate and regular rhythm. Pulses: Normal pulses. Musculoskeletal:      Right lower leg: No edema. Left lower leg: No edema. Comments: Dorsalis pedis pulses 2+ and equal.  No ulcerations on the feet. Has slight yeast intertrigo between the fourth and fifth toes bilaterally. No ulcerations. No erythema or drainage. Nonspecific tenderness of the feet. Skin:     General: Skin is warm and dry. Neurological:      Mental Status: He is alert. MDM  Number of Diagnoses or Management Options  Diagnosis management comments: Check blood sugar. Patient's issues seem to be related to diabetic neuropathy and chronic issues with obtaining medication. Does see cardiology. Check screening lab work. Imaging of feet assess for stress fractures or osteomyelitis. Have discussed with case management so they can reinforce with patient what his options are. Amount and/or Complexity of Data Reviewed  Clinical lab tests: ordered and reviewed  Tests in the radiology section of CPT®: ordered and reviewed  Review and summarize past medical records: yes  Discuss the patient with other providers: yes  Independent visualization of images, tracings, or specimens: yes    Risk of Complications, Morbidity, and/or Mortality  Presenting problems: moderate  Diagnostic procedures: minimal  Management options: low    Patient Progress  Patient progress: stable         Procedures      4:38 PM   spoken with the patient investigated.   Patient staying with someone at this point in [facility]. Not a permanent address. Otherwise essentially undomiciled. No transportation. Does not have ID. None of these issues are particularly new. Disability is pending at this point. Results Include:    Recent Results (from the past 24 hour(s))   GLUCOSE, POC    Collection Time: 02/24/22  3:07 PM   Result Value Ref Range    Glucose (POC) 111 (H) 65 - 100 mg/dL    Performed by Natividad    CBC WITH AUTOMATED DIFF    Collection Time: 02/24/22  3:10 PM   Result Value Ref Range    WBC 7.2 4.3 - 11.1 K/uL    RBC 5.46 4.23 - 5.6 M/uL    HGB 15.4 13.6 - 17.2 g/dL    HCT 46.0 41.1 - 50.3 %    MCV 84.2 79.6 - 97.8 FL    MCH 28.2 26.1 - 32.9 PG    MCHC 33.5 31.4 - 35.0 g/dL    RDW 13.7 11.9 - 14.6 %    PLATELET 553 779 - 911 K/uL    MPV 10.1 9.4 - 12.3 FL    ABSOLUTE NRBC 0.00 0.0 - 0.2 K/uL    DF AUTOMATED      NEUTROPHILS 40 (L) 43 - 78 %    LYMPHOCYTES 51 (H) 13 - 44 %    MONOCYTES 6 4.0 - 12.0 %    EOSINOPHILS 2 0.5 - 7.8 %    BASOPHILS 1 0.0 - 2.0 %    IMMATURE GRANULOCYTES 0 0.0 - 5.0 %    ABS. NEUTROPHILS 2.9 1.7 - 8.2 K/UL    ABS. LYMPHOCYTES 3.7 0.5 - 4.6 K/UL    ABS. MONOCYTES 0.4 0.1 - 1.3 K/UL    ABS. EOSINOPHILS 0.1 0.0 - 0.8 K/UL    ABS. BASOPHILS 0.1 0.0 - 0.2 K/UL    ABS. IMM. GRANS. 0.0 0.0 - 0.5 K/UL   METABOLIC PANEL, COMPREHENSIVE    Collection Time: 02/24/22  3:10 PM   Result Value Ref Range    Sodium 137 (L) 138 - 145 mmol/L    Potassium 4.2 3.5 - 5.1 mmol/L    Chloride 106 98 - 107 mmol/L    CO2 28 21 - 32 mmol/L    Anion gap 3 (L) 7 - 16 mmol/L    Glucose 108 (H) 65 - 100 mg/dL    BUN 11 6 - 23 MG/DL    Creatinine 0.70 (L) 0.8 - 1.5 MG/DL    GFR est AA >60 >60 ml/min/1.73m2    GFR est non-AA >60 >60 ml/min/1.73m2    Calcium 10.0 8.3 - 10.4 MG/DL    Bilirubin, total 0.4 0.2 - 1.1 MG/DL    ALT (SGPT) 31 12 - 65 U/L    AST (SGOT) 22 15 - 37 U/L    Alk.  phosphatase 94 50 - 136 U/L    Protein, total 8.5 (H) 6.3 - 8.2 g/dL    Albumin 4.0 3.5 - 5.0 g/dL    Globulin 4.5 (H) 2.3 - 3.5 g/dL    A-G Ratio 0.9 (L) 1.2 - 3.5     POC URINE MACROSCOPIC    Collection Time: 02/24/22  4:20 PM   Result Value Ref Range    Spec. gravity (POC) 1.020 1.001 - 1.023      pH, urine  (POC) 7.0 5.0 - 9.0      Protein (POC) Negative NEG mg/dL    Glucose, urine (POC) Negative NEG mg/dL    Ketones (POC) Negative NEG mg/dL    Bilirubin (POC) Negative NEG      Blood (POC) Negative NEG      Urobilinogen (POC) 0.2 0.2 - 1.0 EU/dL    Nitrite (POC) Negative NEG      Leukocyte esterase (POC) Negative NEG      Performed by Jennyfer Dent      XR FOOT LT MIN 3 V    Result Date: 2/24/2022  Left Foot INDICATION: Foot pain, diabetic Three views of the left foot were obtained FINDINGS: There is no evidence of fracture or other acute bony abnormality in the foot. No bony lesions are seen. Negative left foot     XR FOOT RT MIN 3 V    Result Date: 2/24/2022  Right Foot INDICATION: Pain, diabetic Three views of the right foot were obtained FINDINGS: There is no evidence of fracture or other acute bony abnormality in the foot. No bony lesions are seen. Negative right foot       Stable for discharge. Patient's issues with his healthcare has been going on for quite a while. 2 main issues at this point towards patient's disability coming through. He does seem to be pursuing that. Does not appear he is pursuing getting an ID or Social Security card very well which is inhibiting some follow-up with other healthcare clinics. Will have patient call to talk with  regarding some other avenues of assistance.

## 2022-02-24 NOTE — ED NOTES
I have reviewed discharge instructions with the patient. The patient verbalized understanding. Patient left ED via Discharge Method: ambulatory to Home with round trip. Opportunity for questions and clarification provided. Patient given 4 scripts. To continue your aftercare when you leave the hospital, you may receive an automated call from our care team to check in on how you are doing. This is a free service and part of our promise to provide the best care and service to meet your aftercare needs.  If you have questions, or wish to unsubscribe from this service please call [phone]. Thank you for Choosing our [facility] Emergency Department.

## 2022-02-24 NOTE — PROGRESS NOTES
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 Information provided for shelters pt states [facility] no transportation back to home in [place]. ED can provide transport to home if needed or shelter if requests. MD to provide RX for medications that are listed at [facility] for $4 each and pt made aware of insulin prices at [facility].

## 2022-02-24 NOTE — DISCHARGE INSTRUCTIONS
Restart your medications. They are $4 at [facility]. You can buy insulin over-the-counter at [facility] without prescription. Call  at [phone]. She can give you further advice and phone numbers regarding resources in [place] in [place]. I tried Tylenol ibuprofen for her leg pain. Follow-up physicians may be able to prescribe more specific medications for the diabetic neuropathy.

## 2022-03-18 PROBLEM — Z95.5 S/P DRUG ELUTING CORONARY STENT PLACEMENT: Status: ACTIVE | Noted: 2020-07-20

## 2022-03-18 PROBLEM — Z72.0 TOBACCO ABUSE: Status: ACTIVE | Noted: 2020-08-19

## 2022-03-19 PROBLEM — E11.9 CONTROLLED TYPE 2 DIABETES MELLITUS WITHOUT COMPLICATION, WITH LONG-TERM CURRENT USE OF INSULIN (HCC): Status: ACTIVE | Noted: 2020-07-10

## 2022-03-19 PROBLEM — Z79.4 CONTROLLED TYPE 2 DIABETES MELLITUS WITHOUT COMPLICATION, WITH LONG-TERM CURRENT USE OF INSULIN (HCC): Status: ACTIVE | Noted: 2020-07-10

## 2022-03-19 PROBLEM — I50.21 ACUTE HFREF (HEART FAILURE WITH REDUCED EJECTION FRACTION) (HCC): Status: ACTIVE | Noted: 2020-06-23

## 2022-03-19 PROBLEM — I21.3 STEMI (ST ELEVATION MYOCARDIAL INFARCTION) (HCC): Status: ACTIVE | Noted: 2020-06-21

## 2022-05-20 ENCOUNTER — APPOINTMENT (OUTPATIENT)
Dept: GENERAL RADIOLOGY | Age: 52
End: 2022-05-20

## 2022-05-20 ENCOUNTER — HOSPITAL ENCOUNTER (EMERGENCY)
Age: 52
Discharge: HOME | End: 2022-05-20

## 2022-05-20 VITALS
DIASTOLIC BLOOD PRESSURE: 71 MMHG | SYSTOLIC BLOOD PRESSURE: 126 MMHG | RESPIRATION RATE: 18 BRPM | BODY MASS INDEX: 26.52 KG/M2 | HEIGHT: 68 IN | OXYGEN SATURATION: 94 % | WEIGHT: 175 LBS | HEART RATE: 92 BPM | TEMPERATURE: 99.2 F

## 2022-05-20 DIAGNOSIS — J06.9 UPPER RESPIRATORY TRACT INFECTION, UNSPECIFIED TYPE: ICD-10-CM

## 2022-05-20 DIAGNOSIS — J02.9 ACUTE PHARYNGITIS, UNSPECIFIED ETIOLOGY: Primary | ICD-10-CM

## 2022-05-20 LAB
ALBUMIN SERPL-MCNC: 3.4 G/DL (ref 3.5–5)
ALBUMIN/GLOB SERPL: 0.9 {RATIO} (ref 1.2–3.5)
ALP SERPL-CCNC: 74 U/L (ref 50–136)
ALT SERPL-CCNC: 23 U/L (ref 12–65)
ANION GAP SERPL CALC-SCNC: 7 MMOL/L (ref 7–16)
AST SERPL-CCNC: 16 U/L (ref 15–37)
ATRIAL RATE: 92 BPM
BASOPHILS # BLD: 0.1 K/UL (ref 0–0.2)
BASOPHILS NFR BLD: 1 % (ref 0–2)
BILIRUB SERPL-MCNC: 0.3 MG/DL (ref 0.2–1.1)
BUN SERPL-MCNC: 17 MG/DL (ref 6–23)
CALCIUM SERPL-MCNC: 8.9 MG/DL (ref 8.3–10.4)
CALCULATED P AXIS, ECG09: 50 DEGREES
CALCULATED R AXIS, ECG10: -36 DEGREES
CALCULATED T AXIS, ECG11: 51 DEGREES
CHLORIDE SERPL-SCNC: 104 MMOL/L (ref 98–107)
CO2 SERPL-SCNC: 27 MMOL/L (ref 21–32)
COVID-19 RAPID TEST, COVR: NOT DETECTED
CREAT SERPL-MCNC: 0.9 MG/DL (ref 0.8–1.5)
DIAGNOSIS, 93000: NORMAL
DIFFERENTIAL METHOD BLD: ABNORMAL
EOSINOPHIL # BLD: 0.1 K/UL (ref 0–0.8)
EOSINOPHIL NFR BLD: 2 % (ref 0.5–7.8)
ERYTHROCYTE [DISTWIDTH] IN BLOOD BY AUTOMATED COUNT: 14.2 % (ref 11.9–14.6)
FLUAV AG NPH QL IA: NEGATIVE
FLUBV AG NPH QL IA: NEGATIVE
GLOBULIN SER CALC-MCNC: 3.7 G/DL (ref 2.3–3.5)
GLUCOSE SERPL-MCNC: 98 MG/DL (ref 65–100)
HCT VFR BLD AUTO: 38 % (ref 41.1–50.3)
HGB BLD-MCNC: 12.6 G/DL (ref 13.6–17.2)
IMM GRANULOCYTES # BLD AUTO: 0 K/UL (ref 0–0.5)
IMM GRANULOCYTES NFR BLD AUTO: 0 % (ref 0–5)
LIPASE SERPL-CCNC: 93 U/L (ref 73–393)
LYMPHOCYTES # BLD: 2.4 K/UL (ref 0.5–4.6)
LYMPHOCYTES NFR BLD: 29 % (ref 13–44)
MAGNESIUM SERPL-MCNC: 2.2 MG/DL (ref 1.8–2.4)
MCH RBC QN AUTO: 27.7 PG (ref 26.1–32.9)
MCHC RBC AUTO-ENTMCNC: 33.2 G/DL (ref 31.4–35)
MCV RBC AUTO: 83.5 FL (ref 79.6–97.8)
MONOCYTES # BLD: 0.8 K/UL (ref 0.1–1.3)
MONOCYTES NFR BLD: 9 % (ref 4–12)
NEUTS SEG # BLD: 5.1 K/UL (ref 1.7–8.2)
NEUTS SEG NFR BLD: 59 % (ref 43–78)
NRBC # BLD: 0 K/UL (ref 0–0.2)
P-R INTERVAL, ECG05: 170 MS
PLATELET # BLD AUTO: 206 K/UL (ref 150–450)
PMV BLD AUTO: 10.2 FL (ref 9.4–12.3)
POTASSIUM SERPL-SCNC: 3.8 MMOL/L (ref 3.5–5.1)
PROT SERPL-MCNC: 7.1 G/DL (ref 6.3–8.2)
Q-T INTERVAL, ECG07: 350 MS
QRS DURATION, ECG06: 98 MS
QTC CALCULATION (BEZET), ECG08: 432 MS
RBC # BLD AUTO: 4.55 M/UL (ref 4.23–5.6)
SODIUM SERPL-SCNC: 138 MMOL/L (ref 138–145)
SOURCE, COVRS: NORMAL
SPECIMEN SOURCE: NORMAL
TROPONIN-HIGH SENSITIVITY: 6.8 PG/ML (ref 0–14)
TROPONIN-HIGH SENSITIVITY: 7.6 PG/ML (ref 0–14)
VENTRICULAR RATE, ECG03: 92 BPM
WBC # BLD AUTO: 8.5 K/UL (ref 4.3–11.1)

## 2022-05-20 PROCEDURE — 74011250637 HC RX REV CODE- 250/637

## 2022-05-20 PROCEDURE — 96375 TX/PRO/DX INJ NEW DRUG ADDON: CPT

## 2022-05-20 PROCEDURE — 85025 COMPLETE CBC W/AUTO DIFF WBC: CPT

## 2022-05-20 PROCEDURE — 84484 ASSAY OF TROPONIN QUANT: CPT

## 2022-05-20 PROCEDURE — 83735 ASSAY OF MAGNESIUM: CPT

## 2022-05-20 PROCEDURE — 99285 EMERGENCY DEPT VISIT HI MDM: CPT

## 2022-05-20 PROCEDURE — 87635 SARS-COV-2 COVID-19 AMP PRB: CPT

## 2022-05-20 PROCEDURE — 71046 X-RAY EXAM CHEST 2 VIEWS: CPT

## 2022-05-20 PROCEDURE — 87804 INFLUENZA ASSAY W/OPTIC: CPT

## 2022-05-20 PROCEDURE — 80053 COMPREHEN METABOLIC PANEL: CPT

## 2022-05-20 PROCEDURE — 74011000258 HC RX REV CODE- 258

## 2022-05-20 PROCEDURE — 93005 ELECTROCARDIOGRAM TRACING: CPT

## 2022-05-20 PROCEDURE — 74011250636 HC RX REV CODE- 250/636

## 2022-05-20 PROCEDURE — 83690 ASSAY OF LIPASE: CPT

## 2022-05-20 PROCEDURE — 96365 THER/PROPH/DIAG IV INF INIT: CPT

## 2022-05-20 RX ORDER — SODIUM CHLORIDE 0.9 % (FLUSH) 0.9 %
5-10 SYRINGE (ML) INJECTION AS NEEDED
Status: DISCONTINUED | OUTPATIENT
Start: 2022-05-20 | End: 2022-05-20 | Stop reason: HOSPADM

## 2022-05-20 RX ORDER — SODIUM CHLORIDE 0.9 % (FLUSH) 0.9 %
5-10 SYRINGE (ML) INJECTION EVERY 8 HOURS
Status: DISCONTINUED | OUTPATIENT
Start: 2022-05-20 | End: 2022-05-20 | Stop reason: HOSPADM

## 2022-05-20 RX ORDER — ACETAMINOPHEN 500 MG
1000 TABLET ORAL
Status: COMPLETED | OUTPATIENT
Start: 2022-05-20 | End: 2022-05-20

## 2022-05-20 RX ORDER — PREDNISONE 10 MG/1
TABLET ORAL
Qty: 21 TABLET | Refills: 0 | Status: SHIPPED | OUTPATIENT
Start: 2022-05-20

## 2022-05-20 RX ORDER — AZITHROMYCIN 250 MG/1
TABLET, FILM COATED ORAL
Qty: 6 TABLET | Refills: 0 | Status: SHIPPED | OUTPATIENT
Start: 2022-05-20

## 2022-05-20 RX ADMIN — METHYLPREDNISOLONE SODIUM SUCCINATE 125 MG: 125 INJECTION, POWDER, FOR SOLUTION INTRAMUSCULAR; INTRAVENOUS at 05:46

## 2022-05-20 RX ADMIN — CEFTRIAXONE 1 G: 1 INJECTION, POWDER, FOR SOLUTION INTRAMUSCULAR; INTRAVENOUS at 05:45

## 2022-05-20 RX ADMIN — ACETAMINOPHEN 1000 MG: 500 TABLET ORAL at 05:46

## 2022-05-20 NOTE — ED PROVIDER NOTES
56-year-old male cough cold congestion sore throat 2-day onset patient staying at the shelter had subjective fever tonight. Chest Pain   This is a new problem. The current episode started more than 2 days ago. The problem has not changed since onset. The problem occurs constantly. The pain is associated with normal activity. The pain is at a severity of 5/10. The pain is moderate. The quality of the pain is described as dull. Associated symptoms include cough, a fever, malaise/fatigue and sputum production. Pertinent negatives include no shortness of breath. He has tried nothing for the symptoms. Risk factors include smoking/tobacco exposure and male gender. His past medical history does not include DVT. Past Medical History:   Diagnosis Date    CAD (coronary artery disease)     Contact dermatitis and eczema due to cause     Diabetes ([facility] [address]     Hypertension     Other ill-defined conditions(799.89)     high cholesterol       Past Surgical History:   Procedure Laterality Date    HX APPENDECTOMY      HX OTHER SURGICAL      hemorrhoidectomy         No family history on file.     Social History     Socioeconomic History    Marital status:      Spouse name: Not on file    Number of children: Not on file    Years of education: Not on file    Highest education level: Not on file   Occupational History    Not on file   Tobacco Use    Smoking status: Current Every Day Smoker     Packs/day: 0.50    Smokeless tobacco: Never Used   Substance and Sexual Activity    Alcohol use: Yes     Comment: socially    Drug use: No    Sexual activity: Yes     Partners: Male   Other Topics Concern    Not on file   Social History Narrative    Not on file     Social Determinants of Health     Financial Resource Strain:     Difficulty of Paying Living Expenses: Not on file   Food Insecurity:     Worried About Running Out of Food in the Last Year: Not on file    Vincenzo of Food in the Last Year: Not on file Transportation Needs:     Lack of Transportation (Medical): Not on file    Lack of Transportation (Non-Medical): Not on file   Physical Activity:     Days of Exercise per Week: Not on file    Minutes of Exercise per Session: Not on file   Stress:     Feeling of Stress : Not on file   Social Connections:     Frequency of Communication with Friends and Family: Not on file    Frequency of Social Gatherings with Friends and Family: Not on file    Attends Yazidi Services: Not on file    Active Member of [address] [place] [address] [employer] or Organizations: Not on file    Attends Club or Organization Meetings: Not on file    Marital Status: Not on file   Intimate Partner Violence:     Fear of Current or Ex-Partner: Not on file    Emotionally Abused: Not on file    Physically Abused: Not on file    Sexually Abused: Not on file   Housing Stability:     Unable to Pay for Housing in the Last Year: Not on file    Number of [place] in the Last Year: Not on file    Unstable Housing in the Last Year: Not on file         ALLERGIES: Bactrim [sulfamethoprim], Gabapentin, and Sulfa (sulfonamide antibiotics)    Review of Systems   Constitutional: Positive for fever and malaise/fatigue. Negative for activity change. HENT: Negative. Eyes: Negative. Respiratory: Positive for cough and sputum production. Negative for shortness of breath. Cardiovascular: Positive for chest pain. Gastrointestinal: Negative. Genitourinary: Negative. Musculoskeletal: Negative. Skin: Negative. Neurological: Negative. Psychiatric/Behavioral: Negative. All other systems reviewed and are negative. Vitals:    05/20/22 0057 05/20/22 0440   BP: 126/71    Pulse: 92    Resp: 18    Temp: 99.2 °F (37.3 °C)    SpO2: 96% 94%   Weight: 79.4 kg (175 lb)    Height: 5' 8\" (1.727 m)             Physical Exam  Vitals and nursing note reviewed. Constitutional:       General: He is not in acute distress. Appearance: He is well-developed. HENT:      Head: Normocephalic and atraumatic. Right Ear: External ear normal.      Left Ear: External ear normal.      Nose: Nose normal.      Mouth/Throat:      Pharynx: Posterior oropharyngeal erythema present. Eyes:      General: No scleral icterus. Right eye: No discharge. Left eye: No discharge. Conjunctiva/sclera: Conjunctivae normal.      Pupils: Pupils are equal, round, and reactive to light. Cardiovascular:      Rate and Rhythm: Regular rhythm. Pulmonary:      Effort: Pulmonary effort is normal. No respiratory distress. Breath sounds: Normal breath sounds. No stridor. No wheezing or rales. Abdominal:      General: Bowel sounds are normal. There is no distension. Palpations: Abdomen is soft. Tenderness: There is no abdominal tenderness. Musculoskeletal:         General: Normal range of motion. Cervical back: Normal range of motion. Skin:     General: Skin is warm and dry. Findings: No rash. Neurological:      Mental Status: He is alert and oriented to person, place, and time. Motor: No abnormal muscle tone. Coordination: Coordination normal.   Psychiatric:         Behavior: Behavior normal.          MDM  Number of Diagnoses or Management Options  Diagnosis management comments: Patient shows signs of an upper respiratory illness. He is encouraged to reduce his smoking to rest drink plenty of fluids use Tylenol Motrin for fever and pain.   Follow-up with outpatient       Amount and/or Complexity of Data Reviewed  Clinical lab tests: ordered and reviewed  Tests in the radiology section of CPT®: reviewed and ordered  Tests in the medicine section of CPT®: ordered and reviewed  Decide to obtain previous medical records or to obtain history from someone other than the patient: yes  Review and summarize past medical records: yes  Independent visualization of images, tracings, or specimens: yes    Risk of Complications, Morbidity, and/or Mortality  Presenting problems: moderate  Diagnostic procedures: moderate  Management options: moderate           Procedures

## 2022-05-20 NOTE — ED TRIAGE NOTES
Pt arrived via EMS from shelter c/o fever,sore throat and cp X2 days. Reports all over body aches.  Jeffies CYNDI walker

## 2022-05-20 NOTE — ED NOTES
Patient given discharge teaching and instruction. Patient left ambulatory, with belongings, to the exit. Patient verbalized understanding of teaching.

## 2022-10-20 ENCOUNTER — OFFICE VISIT (OUTPATIENT)
Dept: URGENT CARE | Facility: CLINIC | Age: 52
End: 2022-10-20
Payer: MEDICAID

## 2022-10-20 VITALS
BODY MASS INDEX: 25.92 KG/M2 | TEMPERATURE: 97.3 F | HEART RATE: 91 BPM | OXYGEN SATURATION: 96 % | RESPIRATION RATE: 16 BRPM | WEIGHT: 175 LBS | DIASTOLIC BLOOD PRESSURE: 78 MMHG | SYSTOLIC BLOOD PRESSURE: 127 MMHG | HEIGHT: 69 IN

## 2022-10-20 DIAGNOSIS — K04.7 DENTAL INFECTION: Primary | ICD-10-CM

## 2022-10-20 PROCEDURE — 99213 OFFICE O/P EST LOW 20 MIN: CPT | Performed by: PHYSICIAN ASSISTANT

## 2022-10-20 RX ORDER — AMOXICILLIN AND CLAVULANATE POTASSIUM 875; 125 MG/1; MG/1
1 TABLET, FILM COATED ORAL 2 TIMES DAILY
Qty: 20 TABLET | Refills: 0 | Status: SHIPPED | OUTPATIENT
Start: 2022-10-20 | End: 2022-10-30

## 2022-10-20 RX ORDER — IBUPROFEN 600 MG/1
600 TABLET ORAL 4 TIMES DAILY PRN
Qty: 40 TABLET | Refills: 0 | Status: SHIPPED | OUTPATIENT
Start: 2022-10-20

## 2022-10-20 ASSESSMENT — ENCOUNTER SYMPTOMS
VOMITING: 0
ABDOMINAL PAIN: 0
DIARRHEA: 0
NAUSEA: 0
SHORTNESS OF BREATH: 0
COUGH: 0
CHEST TIGHTNESS: 0
SORE THROAT: 0

## 2022-10-20 NOTE — PROGRESS NOTES
Veronica Church (: 1970) is a 45 y.o. male is here for evaluation of the following chief complaint(s):  Chief Complaint   Patient presents with    Dental Pain     Left front abscessed tooth          ASSESSMENT/PLAN:  Below is the assessment and plan developed based on review of pertinent history, physical exam, labs, studies, and medications. Jeanie Salmeron was seen today for dental pain. Diagnoses and all orders for this visit:    Dental infection  -     amoxicillin-clavulanate (AUGMENTIN) 875-125 MG per tablet; Take 1 tablet by mouth 2 times daily for 10 days  -     ibuprofen (ADVIL;MOTRIN) 600 MG tablet; Take 1 tablet by mouth 4 times daily as needed for Pain     Augmentin as prescribed. We discussed potential side effects and appropriate precautions given. Patient to see dentist ASAP. Symptomatic treatment discussed. Ibuprofen as needed. Strict ER precautions given. Patient to follow up with PCP as discussed. Patient to return to clinic if symptoms persist or worsen. We discussed reasons to present to the ER or call 911, including but not limited to headache, blurred vision, speech disturbance, difficulty with ambulation/gait, numbness, tingling, weakness, chest pain, shortness of breath, syncope. Patient verbalizes understanding and agreement. SUBJECTIVE/OBJECTIVE:  Patient is a 46 y/o male who presents  today endorsing dental infection. He has multiple broken teeth and is awaiting an appt with his dentist to get his teeth pulled and get dentures. He endorses 2 days tenderness and swelling to his gums on the the left lower side of his mouth. He has not taken anything for symptoms. Denies facial swelling, difficulty swallowing. Patient denies fever, chills, chest pain, shortness of breath, nausea, vomiting, diarrhea, abdominal or back pain, lightheadedness, dizziness, weakness.           Allergies   Allergen Reactions    Sulfamethoxazole-Trimethoprim Other (See Comments)     Burns me inside out Sulfa Antibiotics Rash     Past Medical History:   Diagnosis Date    CAD (coronary artery disease)     Contact dermatitis and eczema due to cause     Diabetes ([facility] [address]     Hypertension     Other ill-defined conditions(799.89)     high cholesterol     Past Surgical History:   Procedure Laterality Date    APPENDECTOMY      OTHER SURGICAL HISTORY      hemorrhoidectomy     No family history on file. Social Connections: Not on file          Review of Systems   Constitutional:  Negative for chills and fever. HENT:  Positive for dental problem. Negative for drooling and sore throat. Respiratory:  Negative for cough, chest tightness and shortness of breath. Cardiovascular:  Negative for chest pain, palpitations and leg swelling. Gastrointestinal:  Negative for abdominal pain, diarrhea, nausea and vomiting. Skin:  Negative for rash. Neurological:  Negative for dizziness, weakness, light-headedness and headaches. /78 (Site: Right Upper Arm, Position: Sitting, Cuff Size: Large Adult)   Pulse 91   Temp 97.3 °F (36.3 °C) (Temporal)   Resp 16   Ht 5' 9\" (1.753 m)   Wt 175 lb (79.4 kg)   SpO2 96%   BMI 25.84 kg/m²      Physical Exam  Constitutional:       Appearance: Normal appearance. HENT:      Head: Normocephalic and atraumatic. Mouth/Throat:      Dentition: Abnormal dentition. Dental tenderness and dental caries present. No dental abscesses. Comments: There is some gum erythema, tenderness to left lower side of mouth. Multiple broken teeth noted throughout mouth. Cardiovascular:      Rate and Rhythm: Normal rate and regular rhythm. Pulses: Normal pulses. Heart sounds: Normal heart sounds. Pulmonary:      Effort: Pulmonary effort is normal.      Breath sounds: Normal breath sounds. Skin:     General: Skin is warm and dry. Neurological:      General: No focal deficit present. Mental Status: He is alert.    Psychiatric:         Mood and Affect: Mood normal. Behavior: Behavior normal.       An electronic signature was used to authenticate this note.   -- CAROLA Palma

## 2023-11-06 ENCOUNTER — HOSPITAL ENCOUNTER (EMERGENCY)
Age: 53
Discharge: HOME | End: 2023-11-06
Attending: EMERGENCY MEDICINE
Payer: COMMERCIAL

## 2023-11-06 VITALS
HEIGHT: 68 IN | RESPIRATION RATE: 16 BRPM | HEART RATE: 90 BPM | WEIGHT: 177 LBS | DIASTOLIC BLOOD PRESSURE: 71 MMHG | OXYGEN SATURATION: 96 % | SYSTOLIC BLOOD PRESSURE: 123 MMHG | TEMPERATURE: 98.9 F | BODY MASS INDEX: 26.83 KG/M2

## 2023-11-06 DIAGNOSIS — K04.7 DENTAL INFECTION: Primary | ICD-10-CM

## 2023-11-06 PROCEDURE — 99284 EMERGENCY DEPT VISIT MOD MDM: CPT

## 2023-11-06 PROCEDURE — 96372 THER/PROPH/DIAG INJ SC/IM: CPT

## 2023-11-06 PROCEDURE — 6360000002 HC RX W HCPCS: Performed by: EMERGENCY MEDICINE

## 2023-11-06 PROCEDURE — 6370000000 HC RX 637 (ALT 250 FOR IP): Performed by: EMERGENCY MEDICINE

## 2023-11-06 RX ORDER — KETOROLAC TROMETHAMINE 10 MG/1
10 TABLET, FILM COATED ORAL EVERY 6 HOURS PRN
Qty: 20 TABLET | Refills: 0 | Status: SHIPPED | OUTPATIENT
Start: 2023-11-06

## 2023-11-06 RX ORDER — AMOXICILLIN AND CLAVULANATE POTASSIUM 875; 125 MG/1; MG/1
1 TABLET, FILM COATED ORAL
Status: COMPLETED | OUTPATIENT
Start: 2023-11-06 | End: 2023-11-06

## 2023-11-06 RX ORDER — AMOXICILLIN AND CLAVULANATE POTASSIUM 875; 125 MG/1; MG/1
1 TABLET, FILM COATED ORAL 2 TIMES DAILY
Qty: 14 TABLET | Refills: 0 | Status: SHIPPED | OUTPATIENT
Start: 2023-11-06 | End: 2023-11-13

## 2023-11-06 RX ORDER — KETOROLAC TROMETHAMINE 30 MG/ML
30 INJECTION, SOLUTION INTRAMUSCULAR; INTRAVENOUS ONCE
Status: COMPLETED | OUTPATIENT
Start: 2023-11-06 | End: 2023-11-06

## 2023-11-06 RX ADMIN — KETOROLAC TROMETHAMINE 30 MG: 30 INJECTION, SOLUTION INTRAMUSCULAR; INTRAVENOUS at 08:49

## 2023-11-06 RX ADMIN — AMOXICILLIN AND CLAVULANATE POTASSIUM 1 TABLET: 875; 125 TABLET, FILM COATED ORAL at 08:49

## 2023-11-06 ASSESSMENT — LIFESTYLE VARIABLES
HOW MANY STANDARD DRINKS CONTAINING ALCOHOL DO YOU HAVE ON A TYPICAL DAY: PATIENT DOES NOT DRINK
HOW OFTEN DO YOU HAVE A DRINK CONTAINING ALCOHOL: NEVER
HOW MANY STANDARD DRINKS CONTAINING ALCOHOL DO YOU HAVE ON A TYPICAL DAY: PATIENT DOES NOT DRINK
HOW OFTEN DO YOU HAVE A DRINK CONTAINING ALCOHOL: NEVER

## 2023-11-06 ASSESSMENT — PAIN DESCRIPTION - ORIENTATION: ORIENTATION: LEFT;ANTERIOR

## 2023-11-06 ASSESSMENT — PAIN SCALES - GENERAL
PAINLEVEL_OUTOF10: 4
PAINLEVEL_OUTOF10: 4

## 2023-11-06 ASSESSMENT — PAIN DESCRIPTION - LOCATION: LOCATION: TEETH

## 2023-11-06 ASSESSMENT — PAIN - FUNCTIONAL ASSESSMENT: PAIN_FUNCTIONAL_ASSESSMENT: 0-10

## 2023-11-06 NOTE — DISCHARGE INSTRUCTIONS
Dental Services     The Emergency Department is not able to provide dental services - tooth extractions, root canal. Though we can provide antibiotics for abccess or infection. Listed below are free or low-cost options. THE [address]   [phone]  Tuesday and Thursday- registration starts at 10am. After registering you are given a time to return  Provides free x-rays, extractions, treatment of infection, and dental hygeine. Cannot have medicare, medicaid or other medical insurance coverage  Bring proof of [facility] residency (power bill, for example), Social Security Number and household income documents (including food stamps) as well as any current medications. (**if you do not live in [facility], contact the free clinic in your home county for the availability of dental services)    [address] [place] [address]  [address] [phone]  Monday and Wednesday 8a-5p  Tuesday and Thursday    8a-7p Friday                               12-5p  Provides Adult and Childrens services. X-rays, extractions, treatment of infection, restorative care  Accepts medicaid, private insurance, self-pay. Fees are on a sliding scale based on income (need to bring documentation)    [facility] [address]     [phone]  Monday - Friday 8am-5p  Accepts medicaid for dental (but not dentures under 21)  Provides xrays, extractions, partials and dentures    [facility] [address] [phone]  Monday- Friday 9a-5p  First Come- First Served  Accepts medicaid, or self pay at or near [facility].   Urgent Care only for xrays, extractions fillings and infections    [address] -- Call [facility] [phone],   --- request a visit from the [address]  --- follow the prompts and leave a message  ---A nurse will call you

## 2023-11-06 NOTE — ED PROVIDER NOTES
during the making of this note. This software is not perfect and grammatical and other typographical errors may be present. This note has not been completely proofread for errors.      Leesa Calix MD  11/06/23 [id number]

## 2023-11-06 NOTE — ED TRIAGE NOTES
Pt arrives via EMS from home. Pt reports dental pain for 2 days in front lower part of mouth. Pt also reports a couple falls today. Pt denies losing consciousness and denies hitting head. Pt reports sweating from pain and felt dizzy prior to falls.        DM2 hx

## 2024-01-21 PROBLEM — I21.19 ACUTE MI INFERIOR LATERAL SUBSEQUENT EPISODE CARE (HCC): Status: ACTIVE | Noted: 2024-01-21

## 2024-01-22 NOTE — PROGRESS NOTES
[facility] CARDIOLOGY  [address] [place] [address], [address]  [place] [address]  PHONE: [phone]        NAME:  Marc York  : 1970  MRN: [id number]     PCP:  Rodrick Mckenna MD (Inactive)    SUBJECTIVE:   Marc York is a 53 y.o. male seen for a consultation visit regarding the following:     Chief Complaint   Patient presents with    New Patient    Coronary Artery Disease        HPI:     Doing well since IMI/PCI RCA and staged LCX PCI a few weeks later in , without interval angina, CHF, palpitations, edema, presyncope or syncope.  Doing well from a cardiac standpoint and compliant with medications.  States he is on a lipid medication but does not know the name or dosage, he will call me when he gets home.  Compliant with other medications and blood pressure fairly stable.  He is euvolemic and has no significant abnormalities on exam or ECG.  He sees [facility] and states that he has blood work every 3 months but I cannot see any labs in Care Everywhere.  Still smoking, counseled today.    Past Medical History, Past Surgical History, Family history, Social History, and Medications were all reviewed with the patient today and updated as necessary.     Current Outpatient Medications   Medication Sig Dispense Refill    blood glucose test strips (ASCENSIA AUTODISC VI;ONE TOUCH ULTRA TEST VI) strip As directed      ibuprofen (ADVIL;MOTRIN) 600 MG tablet Take 1 tablet by mouth 4 times daily as needed for Pain 40 tablet 0    Lancets MISC As directed      aspirin 81 MG chewable tablet Take 1 tablet by mouth daily      carvedilol (COREG) 3.125 MG tablet Take 1 tablet by mouth 2 times daily (with meals)      gabapentin (NEURONTIN) 300 MG capsule Take 1 capsule by mouth 2 times daily.      insulin aspart protamine-insulin aspart (NOVOLOG 70/30) (70-30) 100 UNIT/ML injection Inject 21 Units into the skin 2 times daily (before meals)      lisinopril (PRINIVIL;ZESTRIL) 5 MG tablet Take 1 tablet

## 2024-01-24 ENCOUNTER — TELEPHONE (OUTPATIENT)
Age: 54
End: 2024-01-24

## 2024-01-24 ENCOUNTER — OFFICE VISIT (OUTPATIENT)
Age: 54
End: 2024-01-24

## 2024-01-24 VITALS
HEART RATE: 67 BPM | DIASTOLIC BLOOD PRESSURE: 78 MMHG | SYSTOLIC BLOOD PRESSURE: 139 MMHG | BODY MASS INDEX: 27.54 KG/M2 | HEIGHT: 68 IN | WEIGHT: 181.7 LBS

## 2024-01-24 DIAGNOSIS — I21.19 ACUTE MI INFERIOR LATERAL SUBSEQUENT EPISODE CARE (HCC): ICD-10-CM

## 2024-01-24 DIAGNOSIS — I10 PRIMARY HYPERTENSION: Primary | ICD-10-CM

## 2024-01-24 DIAGNOSIS — Z95.5 S/P DRUG ELUTING CORONARY STENT PLACEMENT: ICD-10-CM

## 2024-01-24 DIAGNOSIS — E11.9 CONTROLLED TYPE 2 DIABETES MELLITUS WITHOUT COMPLICATION, WITH LONG-TERM CURRENT USE OF INSULIN (HCC): ICD-10-CM

## 2024-01-24 DIAGNOSIS — I50.21 ACUTE HFREF (HEART FAILURE WITH REDUCED EJECTION FRACTION) (HCC): ICD-10-CM

## 2024-01-24 DIAGNOSIS — Z79.4 CONTROLLED TYPE 2 DIABETES MELLITUS WITHOUT COMPLICATION, WITH LONG-TERM CURRENT USE OF INSULIN (HCC): ICD-10-CM

## 2024-01-24 DIAGNOSIS — Z72.0 TOBACCO ABUSE: ICD-10-CM

## 2024-01-24 RX ORDER — ATORVASTATIN CALCIUM 40 MG/1
40 TABLET, FILM COATED ORAL DAILY
COMMUNITY

## 2024-01-24 NOTE — TELEPHONE ENCOUNTER
Pt called as he had an appt today.  Was calling back with the med he is taking which is Atorvastatin 40 mg, 1 tablet a day at bedtime

## 2024-03-27 ENCOUNTER — TELEPHONE (OUTPATIENT)
Age: 54
End: 2024-03-27

## 2024-03-27 NOTE — TELEPHONE ENCOUNTER
Called and advised pt of ECHO results. Pt verbalized understanding       Echo looks good. No major abnormalities. Keep follow up visit.\" Per Dr. Del Valle.

## 2024-07-28 NOTE — PROGRESS NOTES
[facility] CARDIOLOGY  [address] [place] [address], [address]  [place] [address]  PHONE: [phone]        NAME:  Marc York  : 1970  MRN: [id number]     PCP:  Rodrick Mckenna MD (Inactive)    SUBJECTIVE:   Marc York is a 54 y.o. male seen for a follow up visit regarding the following:     Chief Complaint   Patient presents with    Hypertension    ST elevation (STEMI) myocardial infarction involving right         HPI:     Doing well since IMI/PCI RCA and staged LCX PCI a few weeks later in , without interval angina, CHF, palpitations, edema, presyncope or syncope.  Doing well from a cardiac standpoint and compliant with medications.  Compliant with medications and blood pressure fairly stable.  He is euvolemic. He sees [facility] and states that he has blood work every 3 months but I cannot see any labs in Care Everywhere.  Still smoking, counseled today.    Echocardiogram 3/7/2024:  Left Ventricle Normal left ventricular systolic function. EF by 2D Simpsons Biplane is 56%. Left ventricle size is normal. Increased wall thickness. Findings consistent with mild concentric hypertrophy. Normal wall motion. Normal diastolic function.   Left Atrium Left atrial volume index is normal (16-34 mL/m2).   Right Ventricle Right ventricle size is normal. RV basal diameter is 3.4 cm. RV mid diameter is 2.8 cm. Normal systolic function.   Right Atrium Right atrium size is normal.   Aortic Valve Valve structure is normal. No regurgitation. No stenosis.   Mitral Valve Valve structure is normal. Trace regurgitation. No stenosis noted.   Tricuspid Valve Valve structure is normal. Physiologically normal regurgitation. No stenosis noted. Unable to assess RVSP due to inadequate or insignificant tricuspid regurgitation.   Pulmonic Valve The pulmonic valve was not well visualized. Physiologically normal regurgitation. No stenosis noted.   Aorta Normal sized aortic root. Ao root diameter is 3.5 cm.

## 2024-07-31 ENCOUNTER — OFFICE VISIT (OUTPATIENT)
Age: 54
End: 2024-07-31
Payer: COMMERCIAL

## 2024-07-31 VITALS
SYSTOLIC BLOOD PRESSURE: 136 MMHG | DIASTOLIC BLOOD PRESSURE: 70 MMHG | HEART RATE: 72 BPM | HEIGHT: 69 IN | BODY MASS INDEX: 27.55 KG/M2 | WEIGHT: 186 LBS

## 2024-07-31 DIAGNOSIS — E78.5 DYSLIPIDEMIA ASSOCIATED WITH TYPE 2 DIABETES MELLITUS (HCC): ICD-10-CM

## 2024-07-31 DIAGNOSIS — Z72.0 TOBACCO ABUSE: ICD-10-CM

## 2024-07-31 DIAGNOSIS — I21.19 ACUTE MI INFERIOR LATERAL SUBSEQUENT EPISODE CARE (HCC): Primary | ICD-10-CM

## 2024-07-31 DIAGNOSIS — I50.21 ACUTE HFREF (HEART FAILURE WITH REDUCED EJECTION FRACTION) (HCC): ICD-10-CM

## 2024-07-31 DIAGNOSIS — E11.69 DYSLIPIDEMIA ASSOCIATED WITH TYPE 2 DIABETES MELLITUS (HCC): ICD-10-CM

## 2024-07-31 DIAGNOSIS — Z95.5 S/P DRUG ELUTING CORONARY STENT PLACEMENT: ICD-10-CM

## 2024-07-31 PROCEDURE — 99214 OFFICE O/P EST MOD 30 MIN: CPT | Performed by: INTERNAL MEDICINE

## 2025-01-19 NOTE — PROGRESS NOTES
moist.      Pharynx: Oropharynx is clear.   Eyes:      Extraocular Movements: Extraocular movements intact.      Pupils: Pupils are equal, round, and reactive to light.   Neck:      Vascular: No carotid bruit or JVD.   Cardiovascular:      Rate and Rhythm: Normal rate and regular rhythm.      Heart sounds: No murmur heard.     No friction rub. No gallop.   Pulmonary:      Effort: Pulmonary effort is normal.      Breath sounds: Normal breath sounds. No wheezing or rhonchi.   Abdominal:      General: Abdomen is flat. Bowel sounds are normal. There is no distension.      Palpations: Abdomen is soft.      Tenderness: There is no abdominal tenderness.   Musculoskeletal:         General: Normal range of motion.      Cervical back: Normal range of motion and neck supple. No tenderness.   Skin:     General: Skin is warm and dry.   Neurological:      General: No focal deficit present.      Mental Status: He is alert and oriented to person, place, and time.   Psychiatric:         Mood and Affect: Mood normal.         Behavior: Behavior normal.          Medical problems and test results were reviewed with the patient today.        Lab Results   Component Value Date    LDL 94 06/21/2020       Lab Results   Component Value Date    CHOL 162 06/21/2020     Lab Results   Component Value Date    TRIG 145 06/21/2020     Lab Results   Component Value Date    HDL 39 (L) 06/21/2020     No components found for: \"LDLCHOLESTEROL\", \"LDLCALC\"    Lab Results   Component Value Date    VLDL 29 (H) 06/21/2020       Lab Results   Component Value Date    CHOLHDLRATIO 4.2 06/21/2020        Lab Results   Component Value Date/Time     05/20/2022 01:08 AM    K 3.8 05/20/2022 01:08 AM     05/20/2022 01:08 AM    CO2 27 05/20/2022 01:08 AM    BUN 17 05/20/2022 01:08 AM    CREATININE 0.90 05/20/2022 01:08 AM    GLUCOSE 98 05/20/2022 01:08 AM    CALCIUM 8.9 05/20/2022 01:08 AM         Lab Results   Component Value Date    WBC 8.5 05/20/2022

## 2025-01-22 ENCOUNTER — OFFICE VISIT (OUTPATIENT)
Age: 55
End: 2025-01-22
Payer: COMMERCIAL

## 2025-01-22 VITALS
SYSTOLIC BLOOD PRESSURE: 152 MMHG | HEART RATE: 67 BPM | HEIGHT: 69 IN | WEIGHT: 186 LBS | DIASTOLIC BLOOD PRESSURE: 82 MMHG | BODY MASS INDEX: 27.55 KG/M2

## 2025-01-22 DIAGNOSIS — I50.21 ACUTE HFREF (HEART FAILURE WITH REDUCED EJECTION FRACTION) (HCC): ICD-10-CM

## 2025-01-22 DIAGNOSIS — R21 RASH: Primary | ICD-10-CM

## 2025-01-22 DIAGNOSIS — E78.5 DYSLIPIDEMIA ASSOCIATED WITH TYPE 2 DIABETES MELLITUS (HCC): ICD-10-CM

## 2025-01-22 DIAGNOSIS — Z95.5 S/P DRUG ELUTING CORONARY STENT PLACEMENT: ICD-10-CM

## 2025-01-22 DIAGNOSIS — Z72.0 TOBACCO ABUSE: ICD-10-CM

## 2025-01-22 DIAGNOSIS — E11.69 DYSLIPIDEMIA ASSOCIATED WITH TYPE 2 DIABETES MELLITUS (HCC): ICD-10-CM

## 2025-01-22 DIAGNOSIS — I21.19 ACUTE MI INFERIOR LATERAL SUBSEQUENT EPISODE CARE (HCC): ICD-10-CM

## 2025-01-22 PROCEDURE — 93000 ELECTROCARDIOGRAM COMPLETE: CPT | Performed by: INTERNAL MEDICINE

## 2025-01-22 PROCEDURE — 99214 OFFICE O/P EST MOD 30 MIN: CPT | Performed by: INTERNAL MEDICINE

## 2025-01-22 RX ORDER — INSULIN HUMAN 100 [IU]/ML
INJECTION, SUSPENSION SUBCUTANEOUS
COMMUNITY
Start: 2025-01-09

## 2025-01-22 RX ORDER — LISINOPRIL 10 MG/1
10 TABLET ORAL DAILY
Qty: 90 TABLET | Refills: 3 | Status: SHIPPED | OUTPATIENT
Start: 2025-01-22

## 2025-01-22 RX ORDER — ROSUVASTATIN CALCIUM 40 MG/1
1 TABLET, COATED ORAL DAILY
COMMUNITY
Start: 2024-12-11

## 2025-01-23 ENCOUNTER — OFFICE VISIT (OUTPATIENT)
Dept: INTERNAL MEDICINE CLINIC | Facility: CLINIC | Age: 55
End: 2025-01-23
Payer: COMMERCIAL

## 2025-01-23 VITALS
SYSTOLIC BLOOD PRESSURE: 132 MMHG | TEMPERATURE: 97.2 F | HEIGHT: 69 IN | HEART RATE: 79 BPM | WEIGHT: 185 LBS | OXYGEN SATURATION: 100 % | BODY MASS INDEX: 27.4 KG/M2 | DIASTOLIC BLOOD PRESSURE: 70 MMHG

## 2025-01-23 DIAGNOSIS — I10 PRIMARY HYPERTENSION: ICD-10-CM

## 2025-01-23 DIAGNOSIS — Z79.4 CONTROLLED TYPE 2 DIABETES MELLITUS WITHOUT COMPLICATION, WITH LONG-TERM CURRENT USE OF INSULIN (HCC): Primary | ICD-10-CM

## 2025-01-23 DIAGNOSIS — I25.2 HISTORY OF MI (MYOCARDIAL INFARCTION): ICD-10-CM

## 2025-01-23 DIAGNOSIS — E11.9 CONTROLLED TYPE 2 DIABETES MELLITUS WITHOUT COMPLICATION, WITH LONG-TERM CURRENT USE OF INSULIN (HCC): Primary | ICD-10-CM

## 2025-01-23 DIAGNOSIS — B18.2 CHRONIC HEPATITIS C WITHOUT HEPATIC COMA (HCC): ICD-10-CM

## 2025-01-23 DIAGNOSIS — R21 RASH: ICD-10-CM

## 2025-01-23 DIAGNOSIS — E11.69 DYSLIPIDEMIA ASSOCIATED WITH TYPE 2 DIABETES MELLITUS (HCC): ICD-10-CM

## 2025-01-23 DIAGNOSIS — E78.5 DYSLIPIDEMIA ASSOCIATED WITH TYPE 2 DIABETES MELLITUS (HCC): ICD-10-CM

## 2025-01-23 PROBLEM — F19.11 HISTORY OF DRUG ABUSE (HCC): Status: ACTIVE | Noted: 2025-01-23

## 2025-01-23 PROBLEM — I21.3 STEMI (ST ELEVATION MYOCARDIAL INFARCTION) (HCC): Status: RESOLVED | Noted: 2020-06-21 | Resolved: 2025-01-23

## 2025-01-23 PROBLEM — I50.21 ACUTE HFREF (HEART FAILURE WITH REDUCED EJECTION FRACTION) (HCC): Status: RESOLVED | Noted: 2020-06-23 | Resolved: 2025-01-23

## 2025-01-23 PROBLEM — I21.19 ACUTE MI INFERIOR LATERAL SUBSEQUENT EPISODE CARE (HCC): Status: RESOLVED | Noted: 2024-01-21 | Resolved: 2025-01-23

## 2025-01-23 PROCEDURE — 3077F SYST BP >= 140 MM HG: CPT | Performed by: NURSE PRACTITIONER

## 2025-01-23 PROCEDURE — 3078F DIAST BP <80 MM HG: CPT | Performed by: NURSE PRACTITIONER

## 2025-01-23 PROCEDURE — 99204 OFFICE O/P NEW MOD 45 MIN: CPT | Performed by: NURSE PRACTITIONER

## 2025-01-23 RX ORDER — EMPAGLIFLOZIN 25 MG/1
TABLET, FILM COATED ORAL
COMMUNITY

## 2025-01-23 RX ORDER — MELOXICAM 15 MG/1
1 TABLET ORAL DAILY PRN
COMMUNITY
Start: 2024-02-21 | End: 2025-01-23

## 2025-01-23 RX ORDER — TRIAMCINOLONE ACETONIDE 0.25 MG/G
OINTMENT TOPICAL
Qty: 1 EACH | Refills: 0 | Status: SHIPPED | OUTPATIENT
Start: 2025-01-23 | End: 2025-01-30

## 2025-01-23 SDOH — ECONOMIC STABILITY: FOOD INSECURITY: WITHIN THE PAST 12 MONTHS, THE FOOD YOU BOUGHT JUST DIDN'T LAST AND YOU DIDN'T HAVE MONEY TO GET MORE.: NEVER TRUE

## 2025-01-23 SDOH — ECONOMIC STABILITY: FOOD INSECURITY: WITHIN THE PAST 12 MONTHS, YOU WORRIED THAT YOUR FOOD WOULD RUN OUT BEFORE YOU GOT MONEY TO BUY MORE.: NEVER TRUE

## 2025-01-23 ASSESSMENT — ENCOUNTER SYMPTOMS
COUGH: 0
SHORTNESS OF BREATH: 0

## 2025-01-23 ASSESSMENT — PATIENT HEALTH QUESTIONNAIRE - PHQ9
SUM OF ALL RESPONSES TO PHQ QUESTIONS 1-9: 0
1. LITTLE INTEREST OR PLEASURE IN DOING THINGS: NOT AT ALL
SUM OF ALL RESPONSES TO PHQ QUESTIONS 1-9: 0
SUM OF ALL RESPONSES TO PHQ QUESTIONS 1-9: 0
2. FEELING DOWN, DEPRESSED OR HOPELESS: NOT AT ALL
SUM OF ALL RESPONSES TO PHQ QUESTIONS 1-9: 0
SUM OF ALL RESPONSES TO PHQ9 QUESTIONS 1 & 2: 0

## 2025-01-23 NOTE — PROGRESS NOTES
[facility]  Office Visit Note    Subjective:  Chief Complaint   Patient presents with    New Patient    Rash     Patient c/o intermittent rash on left arm and bilateral shoulders. Willing to see derm.       Patient ID: Marc York is a 54 y.o. male presenting to the office for the above.    54-year-old male presents to [facility] care.  Previous primary care provider was [facility].  Reviewed patient's past medical history, family history, social history, allergies, and medications.   .  Has four grown sons and three grandkids.  Works at [employer] on [employer]     Rash--  On left arm and shoulder.  Intermittent for several months.  Pruritic.  No known triggers/exposures.   --Trial triamcinolone cream; Discussed risks/benefits, alternatives, potential side effects, proper administration of medication.   --Referral was placed to dermatology by cardiology.     Diabetes--  Diagnosed around 2007 per patient report.   Last A1c 7.1% summer 2024 (per patient report).     Currently treated with Jardiance 25mg daily and Humulin 70/30 (states he has been on this ever since he found out he was diabetic).  Takes 20 units in the morning and 30 units at night.  Tolerating well without report of side effects.    He does not usually check his blood sugars at home.  He does not follow a diabetic diet.  He does not routinely exercise.  Denies hypoglycemic episodes or symptoms.   Previous history/treatments: none   *Urine microalbumin level:   *Diabetic eye exam: advise to update eye exam   *Diabetic foot exam:   --Advise healthy diabetic diet, regular exercise, weight loss.  Monitor glucose regularly at home, and keep log for next visit.   --He is going to return for physical with fasting labs.  Consider alternate insulin regimens in the future.     Hypertension--  Follows with [facility] Cardiology, Dr. Del Valle.   Treated with lisinopril 10mg daily, carvedilol 3.125mg BID.  Tolerating well without

## 2025-03-04 RX ORDER — EMPAGLIFLOZIN 25 MG/1
25 TABLET, FILM COATED ORAL DAILY
Qty: 30 TABLET | Refills: 0 | Status: SHIPPED | OUTPATIENT
Start: 2025-03-04 | End: 2025-04-03

## 2025-03-04 RX ORDER — INSULIN HUMAN 100 [IU]/ML
INJECTION, SUSPENSION SUBCUTANEOUS
Qty: 5 ADJUSTABLE DOSE PRE-FILLED PEN SYRINGE | Refills: 0 | Status: SHIPPED | OUTPATIENT
Start: 2025-03-04

## 2025-03-04 RX ORDER — ROSUVASTATIN CALCIUM 40 MG/1
40 TABLET, COATED ORAL EVERY EVENING
Qty: 30 TABLET | Refills: 0 | Status: SHIPPED | OUTPATIENT
Start: 2025-03-04

## 2025-03-04 NOTE — TELEPHONE ENCOUNTER
Patient is requesting refills of Jardiance 25 mg, taking one daily                                                   Rosuvastatin 40 mg, taking once a day              all prescribed by previous provider                                                  Humulin 70-30 20 units am and 20 units pm  [facility]  Last seen 1-23-25  next OV 3-27-25

## 2025-03-27 ENCOUNTER — OFFICE VISIT (OUTPATIENT)
Dept: INTERNAL MEDICINE CLINIC | Facility: CLINIC | Age: 55
End: 2025-03-27
Payer: COMMERCIAL

## 2025-03-27 VITALS
HEIGHT: 69 IN | BODY MASS INDEX: 27.11 KG/M2 | TEMPERATURE: 97.5 F | OXYGEN SATURATION: 97 % | DIASTOLIC BLOOD PRESSURE: 62 MMHG | HEART RATE: 78 BPM | WEIGHT: 183 LBS | SYSTOLIC BLOOD PRESSURE: 109 MMHG

## 2025-03-27 DIAGNOSIS — E11.9 CONTROLLED TYPE 2 DIABETES MELLITUS WITHOUT COMPLICATION, WITH LONG-TERM CURRENT USE OF INSULIN: ICD-10-CM

## 2025-03-27 DIAGNOSIS — I10 PRIMARY HYPERTENSION: ICD-10-CM

## 2025-03-27 DIAGNOSIS — Z12.5 PROSTATE CANCER SCREENING: ICD-10-CM

## 2025-03-27 DIAGNOSIS — Z13.29 THYROID DISORDER SCREENING: ICD-10-CM

## 2025-03-27 DIAGNOSIS — F19.11 HISTORY OF DRUG ABUSE: ICD-10-CM

## 2025-03-27 DIAGNOSIS — E78.5 DYSLIPIDEMIA ASSOCIATED WITH TYPE 2 DIABETES MELLITUS: ICD-10-CM

## 2025-03-27 DIAGNOSIS — Z00.00 ANNUAL PHYSICAL EXAM: Primary | ICD-10-CM

## 2025-03-27 DIAGNOSIS — E11.69 DYSLIPIDEMIA ASSOCIATED WITH TYPE 2 DIABETES MELLITUS: ICD-10-CM

## 2025-03-27 DIAGNOSIS — Z00.00 ANNUAL PHYSICAL EXAM: ICD-10-CM

## 2025-03-27 DIAGNOSIS — Z23 NEED FOR DIPHTHERIA-TETANUS-PERTUSSIS (TDAP) VACCINE: ICD-10-CM

## 2025-03-27 DIAGNOSIS — I25.2 HISTORY OF MI (MYOCARDIAL INFARCTION): ICD-10-CM

## 2025-03-27 DIAGNOSIS — Z79.4 CONTROLLED TYPE 2 DIABETES MELLITUS WITHOUT COMPLICATION, WITH LONG-TERM CURRENT USE OF INSULIN: ICD-10-CM

## 2025-03-27 DIAGNOSIS — B19.20 HEPATITIS C VIRUS INFECTION WITHOUT HEPATIC COMA, UNSPECIFIED CHRONICITY: ICD-10-CM

## 2025-03-27 LAB
ALBUMIN SERPL-MCNC: 3.9 G/DL (ref 3.5–5)
ALBUMIN/GLOB SERPL: 1.1 (ref 1–1.9)
ALP SERPL-CCNC: 71 U/L (ref 40–129)
ALT SERPL-CCNC: 30 U/L (ref 8–55)
ANION GAP SERPL CALC-SCNC: 8 MMOL/L (ref 7–16)
APPEARANCE UR: CLEAR
AST SERPL-CCNC: 31 U/L (ref 15–37)
BACTERIA URNS QL MICRO: NEGATIVE /HPF
BASOPHILS # BLD: 0.1 K/UL (ref 0–0.2)
BASOPHILS NFR BLD: 1.2 % (ref 0–2)
BILIRUB SERPL-MCNC: 0.4 MG/DL (ref 0–1.2)
BILIRUB UR QL: NEGATIVE
BUN SERPL-MCNC: 20 MG/DL (ref 6–23)
CALCIUM SERPL-MCNC: 9.9 MG/DL (ref 8.8–10.2)
CASTS URNS QL MICRO: 0 /LPF
CHLORIDE SERPL-SCNC: 105 MMOL/L (ref 98–107)
CHOLEST SERPL-MCNC: 124 MG/DL (ref 0–200)
CO2 SERPL-SCNC: 28 MMOL/L (ref 20–29)
COLOR UR: ABNORMAL
CREAT SERPL-MCNC: 0.98 MG/DL (ref 0.8–1.3)
CREAT UR-MCNC: 181 MG/DL (ref 39–259)
CRYSTALS URNS QL MICRO: 0 /LPF
DIFFERENTIAL METHOD BLD: NORMAL
EOSINOPHIL # BLD: 0.23 K/UL (ref 0–0.8)
EOSINOPHIL NFR BLD: 2.7 % (ref 0.5–7.8)
EPI CELLS #/AREA URNS HPF: ABNORMAL /HPF (ref 0–5)
ERYTHROCYTE [DISTWIDTH] IN BLOOD BY AUTOMATED COUNT: 14.6 % (ref 11.9–14.6)
EST. AVERAGE GLUCOSE BLD GHB EST-MCNC: 173 MG/DL
GLOBULIN SER CALC-MCNC: 3.5 G/DL (ref 2.3–3.5)
GLUCOSE SERPL-MCNC: 67 MG/DL (ref 70–99)
GLUCOSE UR STRIP.AUTO-MCNC: >1000 MG/DL
HBA1C MFR BLD: 7.7 % (ref 0–5.6)
HCT VFR BLD AUTO: 48.7 % (ref 41.1–50.3)
HDLC SERPL-MCNC: 40 MG/DL (ref 40–60)
HDLC SERPL: 3.1 (ref 0–5)
HGB BLD-MCNC: 15.4 G/DL (ref 13.6–17.2)
HGB UR QL STRIP: NEGATIVE
HYALINE CASTS URNS QL MICRO: ABNORMAL /LPF
IMM GRANULOCYTES # BLD AUTO: 0.01 K/UL (ref 0–0.5)
IMM GRANULOCYTES NFR BLD AUTO: 0.1 % (ref 0–5)
KETONES UR QL STRIP.AUTO: NEGATIVE MG/DL
LDLC SERPL CALC-MCNC: 66 MG/DL (ref 0–100)
LEUKOCYTE ESTERASE UR QL STRIP.AUTO: NEGATIVE
LYMPHOCYTES # BLD: 3.32 K/UL (ref 0.5–4.6)
LYMPHOCYTES NFR BLD: 39.1 % (ref 13–44)
MCH RBC QN AUTO: 27.6 PG (ref 26.1–32.9)
MCHC RBC AUTO-ENTMCNC: 31.6 G/DL (ref 31.4–35)
MCV RBC AUTO: 87.3 FL (ref 82–102)
MICROALBUMIN UR-MCNC: 32.8 MG/DL (ref 0–20)
MICROALBUMIN/CREAT UR-RTO: 181 MG/G (ref 0–30)
MONOCYTES # BLD: 0.62 K/UL (ref 0.1–1.3)
MONOCYTES NFR BLD: 7.3 % (ref 4–12)
MUCOUS THREADS URNS QL MICRO: 0 /LPF
NEUTS SEG # BLD: 4.22 K/UL (ref 1.7–8.2)
NEUTS SEG NFR BLD: 49.6 % (ref 43–78)
NITRITE UR QL STRIP.AUTO: NEGATIVE
NRBC # BLD: 0 K/UL (ref 0–0.2)
PH UR STRIP: 5.5 (ref 5–9)
PLATELET # BLD AUTO: 211 K/UL (ref 150–450)
PMV BLD AUTO: 10.6 FL (ref 9.4–12.3)
POTASSIUM SERPL-SCNC: 5.1 MMOL/L (ref 3.5–5.1)
PROT SERPL-MCNC: 7.5 G/DL (ref 6.3–8.2)
PROT UR STRIP-MCNC: 30 MG/DL
PSA SERPL-MCNC: 2 NG/ML (ref 0–4)
RBC # BLD AUTO: 5.58 M/UL (ref 4.23–5.6)
RBC #/AREA URNS HPF: ABNORMAL /HPF (ref 0–5)
SODIUM SERPL-SCNC: 141 MMOL/L (ref 136–145)
SP GR UR REFRACTOMETRY: 1.03 (ref 1–1.02)
TRIGL SERPL-MCNC: 90 MG/DL (ref 0–150)
TSH W FREE THYROID IF ABNORMAL: 0.61 UIU/ML (ref 0.27–4.2)
URINE CULTURE IF INDICATED: ABNORMAL
UROBILINOGEN UR QL STRIP.AUTO: 0.2 EU/DL (ref 0.2–1)
VLDLC SERPL CALC-MCNC: 18 MG/DL (ref 6–23)
WBC # BLD AUTO: 8.5 K/UL (ref 4.3–11.1)
WBC URNS QL MICRO: ABNORMAL /HPF (ref 0–4)

## 2025-03-27 PROCEDURE — 99396 PREV VISIT EST AGE 40-64: CPT | Performed by: NURSE PRACTITIONER

## 2025-03-27 PROCEDURE — 3078F DIAST BP <80 MM HG: CPT | Performed by: NURSE PRACTITIONER

## 2025-03-27 PROCEDURE — 90715 TDAP VACCINE 7 YRS/> IM: CPT | Performed by: NURSE PRACTITIONER

## 2025-03-27 PROCEDURE — 3074F SYST BP LT 130 MM HG: CPT | Performed by: NURSE PRACTITIONER

## 2025-03-27 PROCEDURE — 90471 IMMUNIZATION ADMIN: CPT | Performed by: NURSE PRACTITIONER

## 2025-03-27 RX ORDER — CARVEDILOL 3.12 MG/1
3.12 TABLET ORAL 2 TIMES DAILY WITH MEALS
Qty: 180 TABLET | Refills: 1 | Status: SHIPPED | OUTPATIENT
Start: 2025-03-27

## 2025-03-27 RX ORDER — INSULIN HUMAN 100 [IU]/ML
INJECTION, SUSPENSION SUBCUTANEOUS
Qty: 5 ADJUSTABLE DOSE PRE-FILLED PEN SYRINGE | Refills: 0 | Status: CANCELLED | OUTPATIENT
Start: 2025-03-27

## 2025-03-27 RX ORDER — HYDROXYZINE HYDROCHLORIDE 25 MG/1
TABLET, FILM COATED ORAL
COMMUNITY
Start: 2025-02-06

## 2025-03-27 RX ORDER — ROSUVASTATIN CALCIUM 40 MG/1
40 TABLET, COATED ORAL EVERY EVENING
Qty: 90 TABLET | Refills: 1 | Status: SHIPPED | OUTPATIENT
Start: 2025-03-27

## 2025-03-27 RX ORDER — ASPIRIN 81 MG/1
81 TABLET, CHEWABLE ORAL DAILY
Qty: 90 TABLET | Refills: 1 | Status: SHIPPED | OUTPATIENT
Start: 2025-03-27

## 2025-03-27 RX ORDER — INSULIN GLARGINE 100 [IU]/ML
20-40 INJECTION, SOLUTION SUBCUTANEOUS DAILY
Qty: 36 ML | Refills: 1 | Status: SHIPPED | OUTPATIENT
Start: 2025-03-27

## 2025-03-27 RX ORDER — TRIAMCINOLONE ACETONIDE 1 MG/G
CREAM TOPICAL
COMMUNITY
Start: 2025-02-06

## 2025-03-27 RX ORDER — EMPAGLIFLOZIN 25 MG/1
25 TABLET, FILM COATED ORAL DAILY
Qty: 90 TABLET | Refills: 1 | Status: SHIPPED | OUTPATIENT
Start: 2025-03-27 | End: 2025-09-23

## 2025-03-27 ASSESSMENT — ENCOUNTER SYMPTOMS
COUGH: 0
NAUSEA: 0
WHEEZING: 0
DIARRHEA: 0
VOMITING: 0
ABDOMINAL PAIN: 0
SHORTNESS OF BREATH: 0

## 2025-03-27 NOTE — PROGRESS NOTES
[facility]  Office Visit Note    Subjective:  Chief Complaint   Patient presents with    Annual Exam       Patient ID: Marc York is a 55 y.o. male presenting to the office for the above.    55-year-old male for annual physical.  Previous primary care provider was [facility].    .  Has four grown sons and three grandkids.  Works at [employer] on [employer].      Diabetes--  Diagnosed around 2007 per patient report.   Last A1c 7.1% summer 2024 (per patient report).     Currently treated with Jardiance 25mg daily and Humulin 70/30 (states he has been on this ever since he found out he was diabetic).  Takes 20 units in the morning and 20 units at night.  Tolerating well without report of side effects.    He does not usually check his blood sugars at home.  He does not follow a diabetic diet.  He does not routinely exercise.  Denies hypoglycemic episodes or symptoms.   Previous history/treatments: none   *Urine microalbumin level: ordered today   *Diabetic eye exam: advise to update eye exam   *Diabetic foot exam:   Reports some tingling in his feet; states it is tolerable and not severe; does not want to try medication at this time.   --Advise healthy diabetic diet, regular exercise, weight loss.  Monitor glucose regularly at home, and keep log for next visit.   --He is going to return for physical with fasting labs.  Consider alternate insulin regimens in the future.   3/27/2025:   Not checking his glucose at home. Denies any hypoglycemic episodes or symptoms.  Taking Jardiance 25mg daily and Humulin 70/30 (20 units BID).   --Discussed the possibility of changing from 70/30 to basal insulin.  He is open to this.  Discussed plan for stopping Humulin and starting Lantus; discussed titrating Lantus dose to achieve goal fasting glucose. He expresses understanding. Advise to contact office with any questions or concerns.     Hypertension--  Follows with [facility] Cardiology, Dr. Del Valle.

## 2025-03-28 ENCOUNTER — RESULTS FOLLOW-UP (OUTPATIENT)
Dept: INTERNAL MEDICINE CLINIC | Facility: CLINIC | Age: 55
End: 2025-03-28

## 2025-04-09 NOTE — PROGRESS NOTES
Marc York is 55 y.o. y/o male here for initial evaluation.     History of Present Illness  The patient presents for evaluation of hepatitis C.    He was diagnosed with hepatitis C a decade ago but remains skeptical about the diagnosis due to the absence of any symptoms. He reports no difficulty in swallowing.    Additionally, he has been advised to undergo a colonoscopy, a procedure he has not yet experienced.       No procedures noted in the chart review.       CT Abdomen Pelvis w IV Contrast Only 8/4/2021  Impression    No acute abnormalities identified within the abdomen or pelvis.      Lab Results   Component Value Date    HGB 15.4 03/27/2025    WBC 8.5 03/27/2025     03/27/2025    MCV 87.3 03/27/2025    CREATININE 0.98 03/27/2025    ALT 30 03/27/2025    AST 31 03/27/2025       Past Medical History:   Diagnosis Date    Bullous erythema multiforme 03/13/2014    Extensor / dorsal aspect feet and perineum likely from Bactrim.          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    Type 2 diabetes mellitus without complication (HCC)      Past Surgical History:   Procedure Laterality Date    APPENDECTOMY      OTHER SURGICAL HISTORY      hemorrhoidectomy     Family History   Problem Relation Age of Onset    Heart Attack Mother     Cancer Maternal Grandmother      Social History     Tobacco Use    Smoking status: Every Day     Current packs/day: 0.50     Average packs/day: 0.5 packs/day for 43.3 years (21.6 ttl pk-yrs)     Types: Cigarettes     Start date: 1982    Smokeless tobacco: Never   Substance Use Topics    Alcohol use: Not Currently    Drug use: Not Currently     Allergies   Allergen Reactions    Sulfamethoxazole-Trimethoprim Other (See Comments)     Burns me inside out    Gabapentin Nausea And Vomiting    Sulfa

## 2025-04-10 ENCOUNTER — PREP FOR PROCEDURE (OUTPATIENT)
Dept: GASTROENTEROLOGY | Age: 55
End: 2025-04-10

## 2025-04-10 ENCOUNTER — OFFICE VISIT (OUTPATIENT)
Dept: GASTROENTEROLOGY | Age: 55
End: 2025-04-10
Payer: COMMERCIAL

## 2025-04-10 VITALS
DIASTOLIC BLOOD PRESSURE: 74 MMHG | RESPIRATION RATE: 14 BRPM | HEIGHT: 69 IN | SYSTOLIC BLOOD PRESSURE: 132 MMHG | HEART RATE: 80 BPM | WEIGHT: 182.6 LBS | TEMPERATURE: 98 F | BODY MASS INDEX: 27.05 KG/M2 | OXYGEN SATURATION: 95 %

## 2025-04-10 DIAGNOSIS — Z12.11 ENCOUNTER FOR SCREENING COLONOSCOPY: ICD-10-CM

## 2025-04-10 DIAGNOSIS — B19.20 HEPATITIS C VIRUS INFECTION WITHOUT HEPATIC COMA, UNSPECIFIED CHRONICITY: Primary | ICD-10-CM

## 2025-04-10 DIAGNOSIS — Z12.11 COLON CANCER SCREENING: ICD-10-CM

## 2025-04-10 PROCEDURE — 99204 OFFICE O/P NEW MOD 45 MIN: CPT | Performed by: STUDENT IN AN ORGANIZED HEALTH CARE EDUCATION/TRAINING PROGRAM

## 2025-04-10 PROCEDURE — 3078F DIAST BP <80 MM HG: CPT | Performed by: STUDENT IN AN ORGANIZED HEALTH CARE EDUCATION/TRAINING PROGRAM

## 2025-04-10 PROCEDURE — 3075F SYST BP GE 130 - 139MM HG: CPT | Performed by: STUDENT IN AN ORGANIZED HEALTH CARE EDUCATION/TRAINING PROGRAM

## 2025-04-10 RX ORDER — SODIUM CHLORIDE 0.9 % (FLUSH) 0.9 %
5-40 SYRINGE (ML) INJECTION PRN
Status: CANCELLED | OUTPATIENT
Start: 2025-04-10

## 2025-04-10 RX ORDER — SODIUM CHLORIDE 9 MG/ML
25 INJECTION, SOLUTION INTRAVENOUS PRN
Status: CANCELLED | OUTPATIENT
Start: 2025-04-10

## 2025-04-10 RX ORDER — SODIUM CHLORIDE 0.9 % (FLUSH) 0.9 %
5-40 SYRINGE (ML) INJECTION EVERY 12 HOURS SCHEDULED
Status: CANCELLED | OUTPATIENT
Start: 2025-04-10

## 2025-05-06 ENCOUNTER — TELEPHONE (OUTPATIENT)
Dept: GASTROENTEROLOGY | Age: 55
End: 2025-05-06

## 2025-05-06 NOTE — TELEPHONE ENCOUNTER
Patient called and LVM about his procedure that is scheduled for 5/12/25 to do with his insurance. Waiting on brad back 5/06/25.

## 2025-05-07 ENCOUNTER — TELEPHONE (OUTPATIENT)
Dept: GASTROENTEROLOGY | Age: 55
End: 2025-05-07

## 2025-05-07 NOTE — TELEPHONE ENCOUNTER
Returned call to patient regarding colonoscopy procedure on 05/12/25 with Dr Deana thomason requesting call back

## 2025-05-10 PROBLEM — Z12.11 ENCOUNTER FOR SCREENING COLONOSCOPY: Status: RESOLVED | Noted: 2025-04-10 | Resolved: 2025-05-10

## 2025-05-12 PROBLEM — Z12.11 ENCOUNTER FOR SCREENING COLONOSCOPY: Status: ACTIVE | Noted: 2025-04-10

## 2025-06-05 ENCOUNTER — TELEPHONE (OUTPATIENT)
Dept: GASTROENTEROLOGY | Age: 55
End: 2025-06-05

## 2025-06-05 NOTE — TELEPHONE ENCOUNTER
Received provider feedback report indicating that US was not scheduled that Dr Cowart ordered. Called patient to give Radiology scheduling # if he would like to get that scheduled. No answer. LVM with information as well as labs needed. Left office # for any questions or concerns or to  reschedule colonoscopy that was cancelled.       Letter    PROVIDER FEEDBACK LOOP CALLED 3X     Patient:Marc York  : 1970  Referring Provider: MARLENY COWART  Referral Type:  Imaging     Procedures:  29449 (CPT®) - US ABDOMEN LIMITED  Date Service Ordered 4/10/2025        We were unable to reach Marc York to schedule the test ordered by your office after 3 outreach attempts via either text, email and/or phone call.  Please call/follow up with your patient to explain the significance of the ordered test and direct the patient to call Central Scheduling to schedule the test at their earliest convenience.     Please complete one of the following actions from Quick Actions buttons:     Route to Provider:  Route message to ordering provider to seek next steps in care plan.     Telephone Encounter:  Telephone encounter will open.  Call patient to explain significance of ordered test and direct patient to call Central Scheduling to schedule test then Document details of call.     Open Referral:  Review referral notes or details if needed.     Close Referral:  Referral will open.  Document in Notes section of referral why the referral is being closed.  Examples of referral closure:  Patient had test done outside of of an office in the [facility], Patient refuses test, Patient no longer having symptoms, Unable to reach patient.  Only close the referral if you are sure the test will not proceed.     Thank you     Pre-Access Scheduling Team      Summary    Auto: [id number] PROVIDER FEEDBACK LOOP CALLED 3X

## 2025-06-11 PROBLEM — Z12.11 ENCOUNTER FOR SCREENING COLONOSCOPY: Status: RESOLVED | Noted: 2025-04-10 | Resolved: 2025-06-11

## 2025-06-28 NOTE — PROGRESS NOTES
[facility] CARDIOLOGY  [address] [place] [address], [address]  [place] [address]  PHONE: [phone]        NAME:  Marc York  : 1970  MRN: [id number]     PCP:  Lynnette Delgadillo APRN - CNP    SUBJECTIVE:   Marc York is a 55 y.o. male seen for a follow up visit regarding the following:     Chief Complaint   Patient presents with    Hypertension        HPI:     Doing well since IMI/PCI RCA and staged LCX PCI a few weeks later in , without interval angina, CHF, palpitations, edema, presyncope or syncope.  Doing well from a cardiac standpoint and compliant with medications.   Staying active without any significant limitations and still working for [employer] with no exertional symptoms.      Echocardiogram 3/7/2024:  Left Ventricle Normal left ventricular systolic function. EF by 2D Simpsons Biplane is 56%. Left ventricle size is normal. Increased wall thickness. Findings consistent with mild concentric hypertrophy. Normal wall motion. Normal diastolic function.   Left Atrium Left atrial volume index is normal (16-34 mL/m2).   Right Ventricle Right ventricle size is normal. RV basal diameter is 3.4 cm. RV mid diameter is 2.8 cm. Normal systolic function.   Right Atrium Right atrium size is normal.   Aortic Valve Valve structure is normal. No regurgitation. No stenosis.   Mitral Valve Valve structure is normal. Trace regurgitation. No stenosis noted.   Tricuspid Valve Valve structure is normal. Physiologically normal regurgitation. No stenosis noted. Unable to assess RVSP due to inadequate or insignificant tricuspid regurgitation.   Pulmonic Valve The pulmonic valve was not well visualized. Physiologically normal regurgitation. No stenosis noted.   Aorta Normal sized aortic root. Ao root diameter is 3.5 cm.   IVC/Hepatic Veins IVC diameter is less than or equal to 21 mm and decreases greater than 50% during inspiration; therefore the estimated right atrial pressure is normal (~3

## 2025-07-03 ENCOUNTER — OFFICE VISIT (OUTPATIENT)
Age: 55
End: 2025-07-03
Payer: COMMERCIAL

## 2025-07-03 VITALS
DIASTOLIC BLOOD PRESSURE: 74 MMHG | BODY MASS INDEX: 27.11 KG/M2 | HEART RATE: 72 BPM | WEIGHT: 183 LBS | HEIGHT: 69 IN | SYSTOLIC BLOOD PRESSURE: 126 MMHG

## 2025-07-03 DIAGNOSIS — Z79.4 CONTROLLED TYPE 2 DIABETES MELLITUS WITHOUT COMPLICATION, WITH LONG-TERM CURRENT USE OF INSULIN (HCC): ICD-10-CM

## 2025-07-03 DIAGNOSIS — E11.69 DYSLIPIDEMIA ASSOCIATED WITH TYPE 2 DIABETES MELLITUS (HCC): ICD-10-CM

## 2025-07-03 DIAGNOSIS — R21 RASH: ICD-10-CM

## 2025-07-03 DIAGNOSIS — Z72.0 TOBACCO ABUSE: ICD-10-CM

## 2025-07-03 DIAGNOSIS — I25.2 HISTORY OF MI (MYOCARDIAL INFARCTION): ICD-10-CM

## 2025-07-03 DIAGNOSIS — E11.9 CONTROLLED TYPE 2 DIABETES MELLITUS WITHOUT COMPLICATION, WITH LONG-TERM CURRENT USE OF INSULIN (HCC): ICD-10-CM

## 2025-07-03 DIAGNOSIS — I10 PRIMARY HYPERTENSION: ICD-10-CM

## 2025-07-03 DIAGNOSIS — Z95.5 S/P DRUG ELUTING CORONARY STENT PLACEMENT: Primary | ICD-10-CM

## 2025-07-03 DIAGNOSIS — E78.5 DYSLIPIDEMIA ASSOCIATED WITH TYPE 2 DIABETES MELLITUS (HCC): ICD-10-CM

## 2025-07-03 PROCEDURE — 3074F SYST BP LT 130 MM HG: CPT | Performed by: INTERNAL MEDICINE

## 2025-07-03 PROCEDURE — 3078F DIAST BP <80 MM HG: CPT | Performed by: INTERNAL MEDICINE

## 2025-07-03 PROCEDURE — 99214 OFFICE O/P EST MOD 30 MIN: CPT | Performed by: INTERNAL MEDICINE

## 2025-07-03 PROCEDURE — 3051F HG A1C>EQUAL 7.0%<8.0%: CPT | Performed by: INTERNAL MEDICINE

## 2025-07-31 ENCOUNTER — OFFICE VISIT (OUTPATIENT)
Dept: INTERNAL MEDICINE CLINIC | Facility: CLINIC | Age: 55
End: 2025-07-31
Payer: COMMERCIAL

## 2025-07-31 VITALS
SYSTOLIC BLOOD PRESSURE: 121 MMHG | HEIGHT: 69 IN | OXYGEN SATURATION: 95 % | HEART RATE: 68 BPM | TEMPERATURE: 97.5 F | BODY MASS INDEX: 26.96 KG/M2 | WEIGHT: 182 LBS | DIASTOLIC BLOOD PRESSURE: 68 MMHG

## 2025-07-31 DIAGNOSIS — F19.11 HISTORY OF DRUG ABUSE (HCC): ICD-10-CM

## 2025-07-31 DIAGNOSIS — I25.2 HISTORY OF MI (MYOCARDIAL INFARCTION): ICD-10-CM

## 2025-07-31 DIAGNOSIS — E11.9 CONTROLLED TYPE 2 DIABETES MELLITUS WITHOUT COMPLICATION, WITH LONG-TERM CURRENT USE OF INSULIN (HCC): Primary | ICD-10-CM

## 2025-07-31 DIAGNOSIS — B18.2 CHRONIC HEPATITIS C WITHOUT HEPATIC COMA (HCC): ICD-10-CM

## 2025-07-31 DIAGNOSIS — Z79.4 CONTROLLED TYPE 2 DIABETES MELLITUS WITHOUT COMPLICATION, WITH LONG-TERM CURRENT USE OF INSULIN (HCC): Primary | ICD-10-CM

## 2025-07-31 DIAGNOSIS — E11.9 CONTROLLED TYPE 2 DIABETES MELLITUS WITHOUT COMPLICATION, WITH LONG-TERM CURRENT USE OF INSULIN (HCC): ICD-10-CM

## 2025-07-31 DIAGNOSIS — E11.69 DYSLIPIDEMIA ASSOCIATED WITH TYPE 2 DIABETES MELLITUS (HCC): ICD-10-CM

## 2025-07-31 DIAGNOSIS — I10 PRIMARY HYPERTENSION: ICD-10-CM

## 2025-07-31 DIAGNOSIS — E78.5 DYSLIPIDEMIA ASSOCIATED WITH TYPE 2 DIABETES MELLITUS (HCC): ICD-10-CM

## 2025-07-31 DIAGNOSIS — Z79.4 CONTROLLED TYPE 2 DIABETES MELLITUS WITHOUT COMPLICATION, WITH LONG-TERM CURRENT USE OF INSULIN (HCC): ICD-10-CM

## 2025-07-31 LAB
ALBUMIN SERPL-MCNC: 4 G/DL (ref 3.5–5)
ALBUMIN/GLOB SERPL: 1.2 (ref 1–1.9)
ALP SERPL-CCNC: 68 U/L (ref 40–129)
ALT SERPL-CCNC: 31 U/L (ref 8–55)
ANION GAP SERPL CALC-SCNC: 11 MMOL/L (ref 7–16)
AST SERPL-CCNC: 28 U/L (ref 15–37)
BILIRUB SERPL-MCNC: 0.3 MG/DL (ref 0–1.2)
BUN SERPL-MCNC: 20 MG/DL (ref 6–23)
CALCIUM SERPL-MCNC: 10.1 MG/DL (ref 8.8–10.2)
CHLORIDE SERPL-SCNC: 104 MMOL/L (ref 98–107)
CO2 SERPL-SCNC: 24 MMOL/L (ref 20–29)
CREAT SERPL-MCNC: 1.12 MG/DL (ref 0.8–1.3)
EST. AVERAGE GLUCOSE BLD GHB EST-MCNC: 178 MG/DL
GLOBULIN SER CALC-MCNC: 3.4 G/DL (ref 2.3–3.5)
GLUCOSE SERPL-MCNC: 198 MG/DL (ref 70–99)
HBA1C MFR BLD: 7.8 % (ref 0–5.6)
POTASSIUM SERPL-SCNC: 5.1 MMOL/L (ref 3.5–5.1)
PROT SERPL-MCNC: 7.4 G/DL (ref 6.3–8.2)
SODIUM SERPL-SCNC: 139 MMOL/L (ref 136–145)

## 2025-07-31 PROCEDURE — 3078F DIAST BP <80 MM HG: CPT | Performed by: NURSE PRACTITIONER

## 2025-07-31 PROCEDURE — 3051F HG A1C>EQUAL 7.0%<8.0%: CPT | Performed by: NURSE PRACTITIONER

## 2025-07-31 PROCEDURE — 99214 OFFICE O/P EST MOD 30 MIN: CPT | Performed by: NURSE PRACTITIONER

## 2025-07-31 PROCEDURE — 3074F SYST BP LT 130 MM HG: CPT | Performed by: NURSE PRACTITIONER

## 2025-07-31 RX ORDER — EMPAGLIFLOZIN 25 MG/1
25 TABLET, FILM COATED ORAL DAILY
Qty: 90 TABLET | Refills: 1 | Status: SHIPPED | OUTPATIENT
Start: 2025-07-31 | End: 2026-01-27

## 2025-07-31 RX ORDER — CARVEDILOL 3.12 MG/1
3.12 TABLET ORAL 2 TIMES DAILY WITH MEALS
Qty: 180 TABLET | Refills: 1 | Status: SHIPPED | OUTPATIENT
Start: 2025-07-31

## 2025-07-31 RX ORDER — INSULIN GLARGINE 100 [IU]/ML
20-40 INJECTION, SOLUTION SUBCUTANEOUS DAILY
Qty: 36 ML | Refills: 1 | Status: SHIPPED | OUTPATIENT
Start: 2025-07-31

## 2025-07-31 RX ORDER — ASPIRIN 81 MG/1
81 TABLET, CHEWABLE ORAL DAILY
Qty: 90 TABLET | Refills: 1 | Status: SHIPPED | OUTPATIENT
Start: 2025-07-31

## 2025-07-31 RX ORDER — ROSUVASTATIN CALCIUM 40 MG/1
40 TABLET, COATED ORAL EVERY EVENING
Qty: 90 TABLET | Refills: 1 | Status: SHIPPED | OUTPATIENT
Start: 2025-07-31

## 2025-07-31 ASSESSMENT — ENCOUNTER SYMPTOMS
SHORTNESS OF BREATH: 0
COUGH: 0
NAUSEA: 0
VOMITING: 0
ABDOMINAL PAIN: 0
DIARRHEA: 0

## 2025-07-31 NOTE — PROGRESS NOTES
[facility]  Office Visit Note    Subjective:  Chief Complaint   Patient presents with    Diabetes    Hypertension       Patient ID: Marc York is a 55 y.o. male presenting to the office for the above.    55-year-old male for follow up.  Previous primary care provider was [facility].    .  Has four grown sons and three grandkids.  Works at [employer] on [employer].      Diabetes--  Diagnosed around 2007 per patient report.   Last A1c 7.7% March 2025 (7.1% summer 2024 per patient report).     Currently treated with Jardiance 25mg daily and Lantus 20 units daily. Tolerating well without report of side effects.    He does not usually check his blood sugars at home.  He does not follow a diabetic diet.  He does not routinely exercise.  Denies hypoglycemic episodes or symptoms.   Previous history/treatments: Humulin 70/30   *Urine microalbumin level: March 2025   *Diabetic eye exam: advise to update eye exam   *Diabetic foot exam:   Reports some tingling in his feet; states it is tolerable and not severe; does not want to try medication at this time.   --Advise healthy diabetic diet, regular exercise, weight loss.  Monitor glucose regularly at home, and keep log for next visit.   --He is going to return for physical with fasting labs.  Consider alternate insulin regimens in the future.   3/27/2025:   Not checking his glucose at home. Denies any hypoglycemic episodes or symptoms.  Taking Jardiance 25mg daily and Humulin 70/30 (20 units BID).   --Discussed the possibility of changing from 70/30 to basal insulin.  He is open to this.  Discussed plan for stopping Humulin and starting Lantus; discussed titrating Lantus dose to achieve goal fasting glucose. He expresses understanding. Advise to contact office with any questions or concerns.   7/31/2025:   Last A1c 7.7% March 2025.   Taking Jardiance 25mg daily and Lantus 20 units daily. Tolerating well without report of side effects.  Denies